# Patient Record
Sex: FEMALE | Race: WHITE | NOT HISPANIC OR LATINO | Employment: PART TIME | URBAN - METROPOLITAN AREA
[De-identification: names, ages, dates, MRNs, and addresses within clinical notes are randomized per-mention and may not be internally consistent; named-entity substitution may affect disease eponyms.]

---

## 2017-04-27 ENCOUNTER — APPOINTMENT (OUTPATIENT)
Dept: URGENT CARE | Age: 67
End: 2017-04-27
Payer: OTHER MISCELLANEOUS

## 2017-04-27 ENCOUNTER — HOSPITAL ENCOUNTER (OUTPATIENT)
Dept: RADIOLOGY | Age: 67
Discharge: HOME | End: 2017-04-27
Attending: PHYSICIAN ASSISTANT
Payer: OTHER MISCELLANEOUS

## 2017-04-27 ENCOUNTER — TRANSCRIBE ORDERS (OUTPATIENT)
Dept: URGENT CARE | Age: 67
End: 2017-04-27

## 2017-04-27 DIAGNOSIS — T14.90XA INJURY: Primary | ICD-10-CM

## 2017-04-27 DIAGNOSIS — T14.90XA INJURY: ICD-10-CM

## 2017-04-27 PROCEDURE — 99283 EMERGENCY DEPT VISIT LOW MDM: CPT | Performed by: PREVENTIVE MEDICINE

## 2017-04-27 PROCEDURE — 73110 X-RAY EXAM OF WRIST: CPT

## 2017-04-27 PROCEDURE — G0382 LEV 3 HOSP TYPE B ED VISIT: HCPCS | Performed by: PREVENTIVE MEDICINE

## 2018-01-27 ENCOUNTER — OFFICE VISIT (OUTPATIENT)
Dept: URGENT CARE | Age: 68
End: 2018-01-27

## 2018-01-27 VITALS
SYSTOLIC BLOOD PRESSURE: 100 MMHG | WEIGHT: 92 LBS | HEART RATE: 70 BPM | BODY MASS INDEX: 15.71 KG/M2 | RESPIRATION RATE: 20 BRPM | OXYGEN SATURATION: 98 % | DIASTOLIC BLOOD PRESSURE: 64 MMHG | HEIGHT: 64 IN | TEMPERATURE: 98.2 F

## 2018-01-27 DIAGNOSIS — L03.116 CELLULITIS OF LEFT FOOT: Primary | ICD-10-CM

## 2018-01-27 PROCEDURE — G0383 LEV 4 HOSP TYPE B ED VISIT: HCPCS | Performed by: FAMILY MEDICINE

## 2018-01-27 RX ORDER — CEFADROXIL 500 MG/1
500 CAPSULE ORAL EVERY 12 HOURS SCHEDULED
Qty: 20 CAPSULE | Refills: 0 | Status: SHIPPED | OUTPATIENT
Start: 2018-01-27 | End: 2018-02-06

## 2018-01-27 NOTE — PROGRESS NOTES
Assessment/Plan:    No problem-specific Assessment & Plan notes found for this encounter  Diagnoses and all orders for this visit:    Cellulitis of left foot  -     cefadroxil (DURICEF) 500 mg capsule; Take 1 capsule (500 mg total) by mouth every 12 (twelve) hours for 10 days  - Take Duricef twice daily for 10 days  - Probiotics daily  - Go to the ER if the swelling and redness does not improve in 3 days, or if it worsens at any time  - Follow up with a dermatologist to treat your psoriasis  Other orders  -     ibuprofen (ADVIL,MOTRIN) 100 MG tablet; Take 100 mg by mouth every 6 (six) hours as needed for mild pain          Subjective:      Patient ID: Ashwin Garcia is a 69 y o  female  27-year-old female smoker presents with erythema and warmth of her left foot  She states that she used a pumice stone to remove psoriasis plaques on the dorsal aspect her left foot approximately 6 days ago  Approximately 3-4 days ago, the area became erythematous and swollen  She also noticed pain in the area  She was never officially diagnosed with psoriasis by dermatologists, however 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  The following portions of the patient's history were reviewed and updated as appropriate: allergies, current medications, past family history, past medical history, past social history, past surgical history and problem list     Review of Systems   Constitutional: Negative for activity change, chills and fever  HENT: Negative  Skin:        Redness, warmth   All other systems reviewed and are negative  Objective:  Vitals:    01/27/18 1643   BP: 100/64   Pulse: 70   Resp: 20   Temp: 98 2 °F (36 8 °C)   SpO2: 98%          Physical Exam   Constitutional: She is oriented to person, place, and time  She appears well-developed and well-nourished  Neurological: She is alert and oriented to person, place, and time     Skin:        Psychiatric: She has a normal mood and affect  Her behavior is normal    Nursing note and vitals reviewed

## 2018-01-27 NOTE — PATIENT INSTRUCTIONS
Take Duricef twice daily for 10 days  Probiotics daily  Go to the ER if the swelling and redness does not improve in 3 days, or if it worsens at any time  Follow up with a dermatologist to treat your psoriasis  Cellulitis   WHAT YOU NEED TO KNOW:   Cellulitis is a skin infection caused by bacteria  Cellulitis may go away on its own or you may need treatment  Your healthcare provider may draw a [place] around the outside edges of your cellulitis  If your cellulitis spreads, your healthcare provider will see it outside of the [place]  DISCHARGE INSTRUCTIONS:   Call 911 if:   · You have sudden trouble breathing or chest pain  Return to the emergency department if:   · Your wound gets larger and more painful  · You feel a crackling under your skin when you touch it  · You have purple dots or bumps on your skin, or you see bleeding under your skin  · You have new swelling and pain in your legs  · The red, warm, swollen area gets larger  · You see red streaks coming from the infected area  Contact your healthcare provider if:   · You have a fever  · Your fever or pain does not go away or gets worse  · The area does not get smaller after 2 days of antibiotics  · Your skin is flaking or peeling off  · You have questions or concerns about your condition or care  Medicines:   · Antibiotics  help treat the bacterial infection  · NSAIDs , such as ibuprofen, help decrease swelling, pain, and fever  NSAIDs can cause stomach bleeding or kidney problems in certain people  If you take blood thinner medicine, always ask if NSAIDs are safe for you  Always read the medicine label and follow directions  Do not give these medicines to children under 10months of age without direction from your child's healthcare provider  · Acetaminophen  decreases pain and fever  It is available without a doctor's order  Ask how much to take and how often to take it  Follow directions   Read the labels of all other medicines you are using to see if they also contain acetaminophen, or ask your doctor or pharmacist  Acetaminophen can cause liver damage if not taken correctly  Do not use more than 4 grams (4,000 milligrams) total of acetaminophen in one day  · Take your medicine as directed  Contact your healthcare provider if you think your medicine is not helping or if you have side effects  Tell him or her if you are allergic to any medicine  Keep a list of the medicines, vitamins, and herbs you take  Include the amounts, and when and why you take them  Bring the list or the pill bottles to follow-up visits  Carry your medicine list with you in case of an emergency  Self-care:   · Elevate the area above the level of your heart  as often as you can  This will help decrease swelling and pain  Prop the area on pillows or blankets to keep it elevated comfortably  · Clean the area daily until the wound scabs over  Gently wash the area with soap and water  Pat dry  Use dressings as directed  · Place cool or warm, wet cloths on the area as directed  Use clean cloths and clean water  Leave it on the area until the cloth is room temperature  Pat the area dry with a clean, dry cloth  The cloths may help decrease pain  Prevent cellulitis:   · Do not scratch bug bites or areas of injury  You increase your risk for cellulitis by scratching these areas  · Do not share personal items, such as towels, clothing, and razors  · Clean exercise equipment  with germ-killing  before and after you use it  · Wash your hands often  Use soap and water  Wash your hands after you use the bathroom, change a child's diapers, or sneeze  Wash your hands before you prepare or eat food  Use lotion to prevent dry, cracked skin  · Wear pressure stockings as directed  You may be told to wear the stockings if you have peripheral edema  The stockings improve blood flow and decrease swelling      · Treat athlete's foot  This can help prevent the spread of a bacterial skin infection  Follow up with your healthcare provider within 3 days, or as directed: Your healthcare provider will check if your cellulitis is getting better  You may need different medicine  Write down your questions so you remember to ask them during your visits  © 2017 [employer] Information is for End User's use only and may not be sold, redistributed or otherwise used for commercial purposes  All illustrations and images included in CareNotes® are the copyrighted property of [employer]  or [employer]  The above information is an  only  It is not intended as medical advice for individual conditions or treatments  Talk to your doctor, nurse or pharmacist before following any medical regimen to see if it is safe and effective for you

## 2018-03-29 ENCOUNTER — OFFICE VISIT (OUTPATIENT)
Dept: URGENT CARE | Age: 68
End: 2018-03-29

## 2018-03-29 VITALS
TEMPERATURE: 97.8 F | HEART RATE: 97 BPM | BODY MASS INDEX: 15.36 KG/M2 | HEIGHT: 64 IN | WEIGHT: 90 LBS | RESPIRATION RATE: 18 BRPM | OXYGEN SATURATION: 97 % | SYSTOLIC BLOOD PRESSURE: 111 MMHG | DIASTOLIC BLOOD PRESSURE: 54 MMHG

## 2018-03-29 DIAGNOSIS — L03.116 CELLULITIS OF LEFT FOOT: Primary | ICD-10-CM

## 2018-03-29 PROCEDURE — G0381 LEV 2 HOSP TYPE B ED VISIT: HCPCS | Performed by: FAMILY MEDICINE

## 2018-03-29 NOTE — PROGRESS NOTES
[facility]        NAME: Luis Perez is a 69 y o  female  : 1950    MRN: [id number]  DATE: 2018  TIME: 2:49 PM    Assessment and Plan   Cellulitis of left foot [L03 116]  1  Cellulitis of left foot  mupirocin (BACTROBAN) 2 % ointment         Patient Instructions     Patient Instructions   Prescription antibiotic ointment to area twice a day  Please try to avoid pressure on area  Comfortable footwear  Recheck/follow-up with Podiatry or dermatologist as discussed  [facility]  [phone]        Proceed to  ER if symptoms worsen  Chief Complaint     Chief Complaint   Patient presents with    Foot Pain     LEFT FOOT , DENIES INJURY, SEEN HERE 2018          History of Present Illness       Erythematous dry area present dorsal aspect of left foot; no drainage present; patient was seen here 2018 for same; skin lesion appears to possibly be a pressure sore/irritation as patient has a similar area present on dorsum of right foot though it is not reddened (patient states she sits cross-legged on the floor)        Review of Systems   Review of Systems   Skin:        Erythematous dry lesion dorsum of left foot; no drainage present         Current Medications       Current Outpatient Prescriptions:     ibuprofen (ADVIL,MOTRIN) 100 MG tablet, Take 100 mg by mouth every 6 (six) hours as needed for mild pain, Disp: , Rfl:     mupirocin (BACTROBAN) 2 % ointment, Apply topically 2 (two) times a day, Disp: 22 g, Rfl: 1    Current Allergies     Allergies as of 2018 - Reviewed 2018   Allergen Reaction Noted    Tetracyclines & related Hives 2018            The following portions of the patient's history were reviewed and updated as appropriate: allergies, current medications, past family history, past medical history, past social history, past surgical history and problem list      History reviewed  No pertinent past medical history  History reviewed   No pertinent surgical history  No family history on file  Medications have been verified  Objective   /54   Pulse 97   Temp 97 8 °F (36 6 °C) (Temporal)   Resp 18   Ht 5' 4" (1 626 m)   Wt 40 8 kg (90 lb)   SpO2 97%   BMI 15 45 kg/m²        Physical Exam     Physical Exam   Skin: Skin is warm and dry     Erythematous lesion present dorsum of left foot; no drainage noted

## 2018-03-29 NOTE — PATIENT INSTRUCTIONS
Prescription antibiotic ointment to area twice a day  Please try to avoid pressure on area  Comfortable footwear  Recheck/follow-up with Podiatry or dermatologist as discussed    Cortez Kumar  [phone]

## 2018-04-02 ENCOUNTER — OFFICE VISIT (OUTPATIENT)
Dept: URGENT CARE | Age: 68
End: 2018-04-02

## 2018-04-02 VITALS
WEIGHT: 90 LBS | HEART RATE: 68 BPM | TEMPERATURE: 98.1 F | RESPIRATION RATE: 18 BRPM | HEIGHT: 64 IN | DIASTOLIC BLOOD PRESSURE: 60 MMHG | BODY MASS INDEX: 15.36 KG/M2 | SYSTOLIC BLOOD PRESSURE: 98 MMHG

## 2018-04-02 DIAGNOSIS — L03.116 CELLULITIS OF LEFT FOOT: Primary | ICD-10-CM

## 2018-04-02 PROCEDURE — G0382 LEV 3 HOSP TYPE B ED VISIT: HCPCS | Performed by: FAMILY MEDICINE

## 2018-04-02 NOTE — PROGRESS NOTES
[facility]        NAME: Julianen Diana is a 69 y o  female  : 1950    MRN: [id number]  DATE: 2018  TIME: 6:53 PM    Assessment and Plan   Cellulitis of left foot [L03 116]  1  Cellulitis of left foot           Patient Instructions   Continue using bactroban ointment as directed for 10 days  Follow up with podiatry  Reassurance given- discussed at length with patient  Possible leg pain due to gait compensation from foot wound  Chief Complaint     Chief Complaint   Patient presents with    Leg Swelling     c/o left upper thigh swelling  Was seen here on 2018         History of Present Illness       68 y/o female presents with concern for left leg swelling since yesterday  She states "I could be imagining this, but I want you to look"  She also has slight discomfort of her left leg  She is on her feet at work  She is being treated for cellulitis of her left foot with bactroban  She has attempted to make an appt with podiatry, but they have been closed the past two business days due to the Easter holiday  She denies fevers/chills/pus drainage  Review of Systems   Review of Systems   Constitutional: Negative for chills and fever  Skin:         Cellulitis of left foot   All other systems reviewed and are negative          Current Medications       Current Outpatient Prescriptions:     ibuprofen (ADVIL,MOTRIN) 100 MG tablet, Take 100 mg by mouth every 6 (six) hours as needed for mild pain, Disp: , Rfl:     mupirocin (BACTROBAN) 2 % ointment, Apply topically 2 (two) times a day, Disp: 22 g, Rfl: 1    Current Allergies     Allergies as of 2018 - Reviewed 2018   Allergen Reaction Noted    Tetracyclines & related Hives 2018            The following portions of the patient's history were reviewed and updated as appropriate: allergies, current medications, past family history, past medical history, past social history, past surgical history and problem list    History reviewed  No pertinent past medical history  History reviewed  No pertinent surgical history  Objective   BP 98/60   Pulse 68   Temp 98 1 °F (36 7 °C) (Temporal)   Resp 18   Ht 5' 4" (1 626 m)   Wt 40 8 kg (90 lb)   BMI 15 45 kg/m²        Physical Exam     Physical Exam   Constitutional: She is oriented to person, place, and time  She appears well-developed and well-nourished  Neurological: She is alert and oriented to person, place, and time  Skin:        No swelling of left leg  No lymphangitis  nontender left leg  Psychiatric: She has a normal mood and affect  Her behavior is normal    Nursing note and vitals reviewed

## 2018-04-02 NOTE — PATIENT INSTRUCTIONS
Continue using bactroban ointment as directed for 10 days  Follow up with podiatry  ER if worsening s/s

## 2019-01-22 ENCOUNTER — OFFICE VISIT (OUTPATIENT)
Dept: URGENT CARE | Age: 69
End: 2019-01-22

## 2019-01-22 ENCOUNTER — APPOINTMENT (OUTPATIENT)
Dept: RADIOLOGY | Age: 69
End: 2019-01-22
Attending: FAMILY MEDICINE

## 2019-01-22 VITALS
RESPIRATION RATE: 20 BRPM | WEIGHT: 92 LBS | OXYGEN SATURATION: 98 % | HEART RATE: 69 BPM | HEIGHT: 64 IN | BODY MASS INDEX: 15.71 KG/M2 | SYSTOLIC BLOOD PRESSURE: 122 MMHG | TEMPERATURE: 98.2 F | DIASTOLIC BLOOD PRESSURE: 76 MMHG

## 2019-01-22 DIAGNOSIS — M79.672 LEFT FOOT PAIN: Primary | ICD-10-CM

## 2019-01-22 DIAGNOSIS — R52 PAIN: ICD-10-CM

## 2019-01-22 PROCEDURE — 73630 X-RAY EXAM OF FOOT: CPT

## 2019-01-22 PROCEDURE — G0382 LEV 3 HOSP TYPE B ED VISIT: HCPCS | Performed by: FAMILY MEDICINE

## 2019-01-22 NOTE — LETTER
January 22, 2019     Patient: Michelle Parikh   YOB: 1950   Date of Visit: 1/22/2019       To Whom It May Concern: It is my medical opinion that Erich Loza may return to work on 01/25/2019  If you have any questions or concerns, please don't hesitate to call           Sincerely,        Ashutosh Del Real DO    CC: No Recipients

## 2019-01-22 NOTE — PATIENT INSTRUCTIONS
Rest, elevate left foot, limit activity  Comfortable footwear  Continue to use cane to assist ambulation  Pain medication as needed  Recheck/follow-up with family physician/podiatry as discussed    Griselda Lincoln   [phone]

## 2019-01-22 NOTE — PROGRESS NOTES
[facility]        NAME: Vivien Older is a 77 y o  female  : 1950    MRN: [id number]  DATE: 2019  TIME: 6:32 PM    Assessment and Plan   Left foot pain [M79 672]  1  Left foot pain     2  Pain  XR foot 3+ vw left         Patient Instructions     Patient Instructions   Rest, elevate left foot, limit activity  Comfortable footwear  Continue to use cane to assist ambulation  Pain medication as needed  Recheck/follow-up with family physician/podiatry as discussed  [facility]   [phone]          Chief Complaint     Chief Complaint   Patient presents with    Foot Pain     left foot pain radiating up to leg it's been for 2 weeks  History of Present Illness       Patient with generalized pain in her left foot (mainly dorsal aspect) for the past 2 weeks; no known injury; patient states she is using a cane to assist ambulation        Review of Systems   Review of Systems   Musculoskeletal:        Generalized pain in the left foot with slight swelling         Current Medications       Current Outpatient Prescriptions:     ibuprofen (ADVIL,MOTRIN) 100 MG tablet, Take 100 mg by mouth every 6 (six) hours as needed for mild pain, Disp: , Rfl:     mupirocin (BACTROBAN) 2 % ointment, Apply topically 2 (two) times a day (Patient not taking: Reported on 2019 ), Disp: 22 g, Rfl: 1    Current Allergies     Allergies as of 2019 - Reviewed 2019   Allergen Reaction Noted    Tetracyclines & related Hives 2018            The following portions of the patient's history were reviewed and updated as appropriate: allergies, current medications, past family history, past medical history, past social history, past surgical history and problem list      History reviewed  No pertinent past medical history  History reviewed  No pertinent surgical history  History reviewed  No pertinent family history  Medications have been verified          Objective   BP 122/76   Pulse 69   Temp 98 2 °F (36 8 °C) (Temporal)   Resp 20   Ht 5' 4" (1 626 m)   Wt 41 7 kg (92 lb)   SpO2 98%   BMI 15 79 kg/m²        Physical Exam     Physical Exam   Musculoskeletal:   Generalized discomfort of the dorsal aspect of the left foot with slight swelling   Nursing note and vitals reviewed          Left foot x-rays - no fracture noted

## 2020-06-16 ENCOUNTER — OFFICE VISIT (OUTPATIENT)
Dept: FAMILY MEDICINE CLINIC | Facility: CLINIC | Age: 70
End: 2020-06-16

## 2020-06-16 VITALS
HEIGHT: 65 IN | BODY MASS INDEX: 15.73 KG/M2 | WEIGHT: 94.4 LBS | SYSTOLIC BLOOD PRESSURE: 98 MMHG | RESPIRATION RATE: 16 BRPM | TEMPERATURE: 99.2 F | OXYGEN SATURATION: 97 % | HEART RATE: 82 BPM | DIASTOLIC BLOOD PRESSURE: 60 MMHG

## 2020-06-16 DIAGNOSIS — R19.00 ABDOMINAL WALL BULGE: ICD-10-CM

## 2020-06-16 DIAGNOSIS — J30.2 SEASONAL ALLERGIES: ICD-10-CM

## 2020-06-16 DIAGNOSIS — Z12.39 SCREENING FOR BREAST CANCER: ICD-10-CM

## 2020-06-16 DIAGNOSIS — F17.219 CIGARETTE NICOTINE DEPENDENCE WITH NICOTINE-INDUCED DISORDER: ICD-10-CM

## 2020-06-16 DIAGNOSIS — H53.9 VISUAL DISTURBANCES: Primary | ICD-10-CM

## 2020-06-16 PROBLEM — L03.116 CELLULITIS OF LEFT FOOT: Status: RESOLVED | Noted: 2018-01-27 | Resolved: 2020-06-16

## 2020-06-16 PROCEDURE — 3008F BODY MASS INDEX DOCD: CPT | Performed by: FAMILY MEDICINE

## 2020-06-16 PROCEDURE — 1160F RVW MEDS BY RX/DR IN RCRD: CPT | Performed by: FAMILY MEDICINE

## 2020-06-16 PROCEDURE — 99203 OFFICE O/P NEW LOW 30 MIN: CPT | Performed by: FAMILY MEDICINE

## 2020-06-17 DIAGNOSIS — H53.9 VISUAL DISTURBANCES: Primary | ICD-10-CM

## 2020-06-22 ENCOUNTER — TELEPHONE (OUTPATIENT)
Dept: FAMILY MEDICINE CLINIC | Facility: CLINIC | Age: 70
End: 2020-06-22

## 2020-06-22 PROBLEM — F17.219 CIGARETTE NICOTINE DEPENDENCE WITH NICOTINE-INDUCED DISORDER: Status: RESOLVED | Noted: 2020-06-16 | Resolved: 2020-06-22

## 2020-06-22 PROBLEM — Z72.0 TOBACCO USE: Status: ACTIVE | Noted: 2020-06-22

## 2020-06-23 DIAGNOSIS — H53.9 VISUAL DISTURBANCES: ICD-10-CM

## 2020-06-23 DIAGNOSIS — Z72.0 TOBACCO USE: Primary | ICD-10-CM

## 2020-06-29 ENCOUNTER — TELEPHONE (OUTPATIENT)
Dept: FAMILY MEDICINE CLINIC | Facility: CLINIC | Age: 70
End: 2020-06-29

## 2020-07-14 ENCOUNTER — CONSULT (OUTPATIENT)
Dept: SURGERY | Facility: CLINIC | Age: 70
End: 2020-07-14

## 2020-07-14 VITALS
WEIGHT: 94.2 LBS | DIASTOLIC BLOOD PRESSURE: 68 MMHG | HEART RATE: 76 BPM | TEMPERATURE: 99 F | SYSTOLIC BLOOD PRESSURE: 104 MMHG | HEIGHT: 65 IN | BODY MASS INDEX: 15.7 KG/M2

## 2020-07-14 DIAGNOSIS — R19.00 ABDOMINAL WALL BULGE: ICD-10-CM

## 2020-07-14 PROCEDURE — 99201 PR OFFICE OUTPATIENT NEW 10 MINUTES: CPT | Performed by: SURGERY

## 2020-07-14 NOTE — ASSESSMENT & PLAN NOTE
I told her I do not believe there is a hernia present  I suggested some sort of support if that makes her feel better otherwise nothing to do surgically

## 2020-07-14 NOTE — PROGRESS NOTES
Office Visit - General Surgery  Kallie Kaminski MRN: [id number]  Encounter: [id number]    Assessment and Plan    Problem List Items Addressed This Visit        Other    Abdominal wall bulge     I told her I do not believe there is a hernia present  I suggested some sort of support if that makes her feel better otherwise nothing to do surgically  Chief Complaint:  Kallie Kaminski is a 65 y o  female who presents for Mass (Consult abdominal buldge)    Subjective  A 68-year-old woman who was noted a bulge in her lower abdomen  Has been there for some time it is basically annoying because she notices it  Causes no pain change in bowel or bladder habits    Past Medical History  Past Medical History:   Diagnosis Date    Seasonal allergies        Past Surgical History  History reviewed  No pertinent surgical history      Family History  Family History   Problem Relation Age of Onset    No Known Problems Mother        Social History  Social History     Socioeconomic History    Marital status: Single     Spouse name: None    Number of children: None    Years of education: None    Highest education level: None   Occupational History    None   Social Needs    Financial resource strain: None    Food insecurity:     Worry: None     Inability: None    Transportation needs:     Medical: None     Non-medical: None   Tobacco Use    Smoking status: Light Tobacco Smoker    Smokeless tobacco: Never Used   Substance and Sexual Activity    Alcohol use: No    Drug use: No    Sexual activity: Never   Lifestyle    Physical activity:     Days per week: None     Minutes per session: None    Stress: None   Relationships    Social connections:     Talks on phone: None     Gets together: None     Attends [employer] service: None     Active member of club or organization: None     Attends meetings of clubs or organizations: None     Relationship status: None    Intimate partner violence:     Fear of current or ex partner: None     Emotionally abused: None     Physically abused: None     Forced sexual activity: None   Other Topics Concern    None   Social History Narrative    None        Medications  Current Outpatient Medications on File Prior to Visit   Medication Sig Dispense Refill    ibuprofen (ADVIL,MOTRIN) 100 MG tablet Take 100 mg by mouth every 6 (six) hours as needed for mild pain      mupirocin (BACTROBAN) 2 % ointment Apply topically 2 (two) times a day (Patient not taking: Reported on 7/14/2020) 22 g 1     No current facility-administered medications on file prior to visit  Allergies  Allergies   Allergen Reactions    Tetracyclines & Related Hives       Review of Systems    Objective  Vitals:    07/14/20 1112   BP: 104/68   Pulse: 76   Temp: 99 °F (37 2 °C)       Physical Exam   Abdomen:  Flat soft nontender no hernias appreciated    Standing up little bit of bulge below umbilicus but no definite hernias appreciated

## 2020-08-21 ENCOUNTER — APPOINTMENT (OUTPATIENT)
Dept: RADIOLOGY | Age: 70
DRG: 330 | End: 2020-08-21
Payer: MEDICARE

## 2020-08-21 ENCOUNTER — OFFICE VISIT (OUTPATIENT)
Dept: URGENT CARE | Age: 70
End: 2020-08-21

## 2020-08-21 VITALS
RESPIRATION RATE: 18 BRPM | SYSTOLIC BLOOD PRESSURE: 127 MMHG | OXYGEN SATURATION: 98 % | DIASTOLIC BLOOD PRESSURE: 62 MMHG | TEMPERATURE: 98.6 F | HEART RATE: 86 BPM

## 2020-08-21 DIAGNOSIS — R10.9 ABDOMINAL PAIN: ICD-10-CM

## 2020-08-21 DIAGNOSIS — R10.30 LOWER ABDOMINAL PAIN: Primary | ICD-10-CM

## 2020-08-21 LAB
SL AMB  POCT GLUCOSE, UA: ABNORMAL
SL AMB LEUKOCYTE ESTERASE,UA: ABNORMAL
SL AMB POCT BILIRUBIN,UA: ABNORMAL
SL AMB POCT BLOOD,UA: ABNORMAL
SL AMB POCT CLARITY,UA: CLEAR
SL AMB POCT COLOR,UA: YELLOW
SL AMB POCT KETONES,UA: ABNORMAL
SL AMB POCT NITRITE,UA: ABNORMAL
SL AMB POCT PH,UA: 6
SL AMB POCT SPECIFIC GRAVITY,UA: 1.01
SL AMB POCT URINE PROTEIN: ABNORMAL
SL AMB POCT UROBILINOGEN: 0.2

## 2020-08-21 PROCEDURE — 81002 URINALYSIS NONAUTO W/O SCOPE: CPT | Performed by: PHYSICIAN ASSISTANT

## 2020-08-21 PROCEDURE — 99213 OFFICE O/P EST LOW 20 MIN: CPT | Performed by: PHYSICIAN ASSISTANT

## 2020-08-21 PROCEDURE — 74022 RADEX COMPL AQT ABD SERIES: CPT

## 2020-08-21 NOTE — PATIENT INSTRUCTIONS
-clear liquid diet,  -drink plenty of fluids  -go to ER if anything worsens or no bowel movement  -follow up with PCP 3-5 days

## 2020-08-21 NOTE — PROGRESS NOTES
[facility]        NAME: Patricia Gray is a 65 y o  female  : 1950    MRN: [id number]  DATE: 2020  TIME: 7:52 PM    Assessment and Plan   Lower abdominal pain [R10 30]  1  Lower abdominal pain  POCT urine dip         Patient Instructions   -clear liquid diet,  -drink plenty of fluids  -go to ER if anything worsens or no bowel movement  Patient declined going to the emergency room tonight  Follow up with PCP in 3-5 days  Proceed to  ER if symptoms worsen  Chief Complaint     Chief Complaint   Patient presents with    Abdominal Pain     Patient reports lower abdominal cramping since Wednesday  Denies nausea, vomiting, and diarrhea  She reports she took immodium on Monday or Tuesday and then didn't have a BM (small) until today- she reports taking "ClearLax"  She reports the pain has subsided slightly today  History of Present Illness       Pt started with lower abdominal pain on Wednesday with achy cramping  She did take an immodium on Monday  She had a small bowel movement today  She denies any blood  She denies any nausea, vomiting, diarrhea  She did take a laxative today  She was seen for a possible hernia but was told she did not have one  She notes that she has bruising on her right hand that started while she was in the bathroom here in the clinic  She had no injury or pain  Review of Systems   Review of Systems   Constitutional: Negative  HENT: Negative  Respiratory: Negative  Cardiovascular: Negative  Gastrointestinal: Positive for abdominal pain and constipation  Negative for anal bleeding, blood in stool, diarrhea, nausea, rectal pain and vomiting  Musculoskeletal: Negative  Skin: Positive for color change  Neurological: Negative  Psychiatric/Behavioral: Negative            Current Medications       Current Outpatient Medications:     ibuprofen (ADVIL,MOTRIN) 100 MG tablet, Take 100 mg by mouth every 6 (six) hours as needed for mild pain, Disp: , Rfl:     mupirocin (BACTROBAN) 2 % ointment, Apply topically 2 (two) times a day (Patient not taking: Reported on 7/14/2020), Disp: 22 g, Rfl: 1    Current Allergies     Allergies as of 08/21/2020 - Reviewed 08/21/2020   Allergen Reaction Noted    Tetracyclines & related Hives 01/27/2018            The following portions of the patient's history were reviewed and updated as appropriate: allergies, current medications, past family history, past medical history, past social history, past surgical history and problem list      Past Medical History:   Diagnosis Date    Seasonal allergies        History reviewed  No pertinent surgical history  Family History   Problem Relation Age of Onset    No Known Problems Mother          Medications have been verified  Objective   /62   Pulse 86   Temp 98 6 °F (37 °C) (Temporal)   Resp 18   SpO2 98%        Physical Exam     Physical Exam  Vitals signs and nursing note reviewed  Constitutional:       Appearance: She is well-developed  Cardiovascular:      Rate and Rhythm: Normal rate  Pulmonary:      Effort: Pulmonary effort is normal    Abdominal:      General: Abdomen is flat  Bowel sounds are normal  There is no distension  Palpations: Abdomen is soft  There is no shifting dullness, fluid wave, hepatomegaly, mass or pulsatile mass  Tenderness: There is generalized abdominal tenderness  There is no right CVA tenderness, left CVA tenderness, guarding or rebound  Negative signs include Rovsing's sign and McBurney's sign  Hernia: No hernia is present  Comments: Mild tenderness to palpation over lower abdomen   Skin:     General: Skin is warm and dry  Neurological:      General: No focal deficit present  Mental Status: She is alert

## 2020-08-22 ENCOUNTER — APPOINTMENT (EMERGENCY)
Dept: RADIOLOGY | Facility: HOSPITAL | Age: 70
DRG: 330 | End: 2020-08-22
Payer: MEDICARE

## 2020-08-22 ENCOUNTER — TELEPHONE (OUTPATIENT)
Dept: URGENT CARE | Age: 70
End: 2020-08-22

## 2020-08-22 ENCOUNTER — ANESTHESIA (INPATIENT)
Dept: PERIOP | Facility: HOSPITAL | Age: 70
DRG: 330 | End: 2020-08-22
Payer: MEDICARE

## 2020-08-22 ENCOUNTER — HOSPITAL ENCOUNTER (INPATIENT)
Facility: HOSPITAL | Age: 70
LOS: 3 days | Discharge: LEFT BEFORE DISCHARGE | DRG: 330 | End: 2020-08-25
Attending: EMERGENCY MEDICINE | Admitting: SURGERY
Payer: MEDICARE

## 2020-08-22 ENCOUNTER — ANESTHESIA EVENT (INPATIENT)
Dept: PERIOP | Facility: HOSPITAL | Age: 70
DRG: 330 | End: 2020-08-22
Payer: MEDICARE

## 2020-08-22 DIAGNOSIS — K56.609 BOWEL OBSTRUCTION (HCC): Primary | ICD-10-CM

## 2020-08-22 LAB
ABO GROUP BLD: NORMAL
ABO GROUP BLD: NORMAL
ALBUMIN SERPL BCP-MCNC: 3.2 G/DL (ref 3.5–5)
ALP SERPL-CCNC: 87 U/L (ref 46–116)
ALT SERPL W P-5'-P-CCNC: 11 U/L (ref 12–78)
ANION GAP SERPL CALCULATED.3IONS-SCNC: 8 MMOL/L (ref 4–13)
AST SERPL W P-5'-P-CCNC: 16 U/L (ref 5–45)
BASOPHILS # BLD AUTO: 0.02 THOUSANDS/ΜL (ref 0–0.1)
BASOPHILS NFR BLD AUTO: 0 % (ref 0–1)
BILIRUB SERPL-MCNC: 0.77 MG/DL (ref 0.2–1)
BLD GP AB SCN SERPL QL: NEGATIVE
BUN SERPL-MCNC: 14 MG/DL (ref 5–25)
CALCIUM SERPL-MCNC: 8.8 MG/DL (ref 8.3–10.1)
CHLORIDE SERPL-SCNC: 108 MMOL/L (ref 100–108)
CO2 SERPL-SCNC: 22 MMOL/L (ref 21–32)
CREAT SERPL-MCNC: 0.84 MG/DL (ref 0.6–1.3)
EOSINOPHIL # BLD AUTO: 0.19 THOUSAND/ΜL (ref 0–0.61)
EOSINOPHIL NFR BLD AUTO: 2 % (ref 0–6)
ERYTHROCYTE [DISTWIDTH] IN BLOOD BY AUTOMATED COUNT: 13.3 % (ref 11.6–15.1)
GFR SERPL CREATININE-BSD FRML MDRD: 70 ML/MIN/1.73SQ M
GLUCOSE SERPL-MCNC: 102 MG/DL (ref 65–140)
HCT VFR BLD AUTO: 42.2 % (ref 34.8–46.1)
HGB BLD-MCNC: 14 G/DL (ref 11.5–15.4)
IMM GRANULOCYTES # BLD AUTO: 0.03 THOUSAND/UL (ref 0–0.2)
IMM GRANULOCYTES NFR BLD AUTO: 0 % (ref 0–2)
LACTATE SERPL-SCNC: 0.9 MMOL/L (ref 0.5–2)
LIPASE SERPL-CCNC: 45 U/L (ref 73–393)
LYMPHOCYTES # BLD AUTO: 0.88 THOUSANDS/ΜL (ref 0.6–4.47)
LYMPHOCYTES NFR BLD AUTO: 9 % (ref 14–44)
MCH RBC QN AUTO: 33.9 PG (ref 26.8–34.3)
MCHC RBC AUTO-ENTMCNC: 33.2 G/DL (ref 31.4–37.4)
MCV RBC AUTO: 102 FL (ref 82–98)
MONOCYTES # BLD AUTO: 0.7 THOUSAND/ΜL (ref 0.17–1.22)
MONOCYTES NFR BLD AUTO: 7 % (ref 4–12)
NEUTROPHILS # BLD AUTO: 7.77 THOUSANDS/ΜL (ref 1.85–7.62)
NEUTS SEG NFR BLD AUTO: 82 % (ref 43–75)
NRBC BLD AUTO-RTO: 0 /100 WBCS
PLATELET # BLD AUTO: 300 THOUSANDS/UL (ref 149–390)
PLATELET # BLD AUTO: 308 THOUSANDS/UL (ref 149–390)
PMV BLD AUTO: 9.7 FL (ref 8.9–12.7)
PMV BLD AUTO: 9.8 FL (ref 8.9–12.7)
POTASSIUM SERPL-SCNC: 3.8 MMOL/L (ref 3.5–5.3)
PROT SERPL-MCNC: 6.8 G/DL (ref 6.4–8.2)
RBC # BLD AUTO: 4.13 MILLION/UL (ref 3.81–5.12)
RH BLD: POSITIVE
RH BLD: POSITIVE
SODIUM SERPL-SCNC: 138 MMOL/L (ref 136–145)
SPECIMEN EXPIRATION DATE: NORMAL
WBC # BLD AUTO: 9.59 THOUSAND/UL (ref 4.31–10.16)

## 2020-08-22 PROCEDURE — 99285 EMERGENCY DEPT VISIT HI MDM: CPT

## 2020-08-22 PROCEDURE — 99222 1ST HOSP IP/OBS MODERATE 55: CPT | Performed by: SURGERY

## 2020-08-22 PROCEDURE — 80053 COMPREHEN METABOLIC PANEL: CPT | Performed by: EMERGENCY MEDICINE

## 2020-08-22 PROCEDURE — 74177 CT ABD & PELVIS W/CONTRAST: CPT

## 2020-08-22 PROCEDURE — 86900 BLOOD TYPING SEROLOGIC ABO: CPT | Performed by: SURGERY

## 2020-08-22 PROCEDURE — 96360 HYDRATION IV INFUSION INIT: CPT

## 2020-08-22 PROCEDURE — 85049 AUTOMATED PLATELET COUNT: CPT | Performed by: SURGERY

## 2020-08-22 PROCEDURE — 88309 TISSUE EXAM BY PATHOLOGIST: CPT | Performed by: PATHOLOGY

## 2020-08-22 PROCEDURE — G1004 CDSM NDSC: HCPCS

## 2020-08-22 PROCEDURE — 0DBA0ZZ EXCISION OF JEJUNUM, OPEN APPROACH: ICD-10-PCS | Performed by: SURGERY

## 2020-08-22 PROCEDURE — 86901 BLOOD TYPING SEROLOGIC RH(D): CPT | Performed by: SURGERY

## 2020-08-22 PROCEDURE — 36415 COLL VENOUS BLD VENIPUNCTURE: CPT | Performed by: EMERGENCY MEDICINE

## 2020-08-22 PROCEDURE — 96361 HYDRATE IV INFUSION ADD-ON: CPT

## 2020-08-22 PROCEDURE — C9290 INJ, BUPIVACAINE LIPOSOME: HCPCS | Performed by: ANESTHESIOLOGY

## 2020-08-22 PROCEDURE — 44121 REMOVAL OF SMALL INTESTINE: CPT | Performed by: SURGERY

## 2020-08-22 PROCEDURE — 85025 COMPLETE CBC W/AUTO DIFF WBC: CPT | Performed by: EMERGENCY MEDICINE

## 2020-08-22 PROCEDURE — 93005 ELECTROCARDIOGRAM TRACING: CPT

## 2020-08-22 PROCEDURE — 99285 EMERGENCY DEPT VISIT HI MDM: CPT | Performed by: EMERGENCY MEDICINE

## 2020-08-22 PROCEDURE — 83605 ASSAY OF LACTIC ACID: CPT | Performed by: EMERGENCY MEDICINE

## 2020-08-22 PROCEDURE — 0DBB0ZZ EXCISION OF ILEUM, OPEN APPROACH: ICD-10-PCS | Performed by: SURGERY

## 2020-08-22 PROCEDURE — 44120 REMOVAL OF SMALL INTESTINE: CPT | Performed by: SURGERY

## 2020-08-22 PROCEDURE — 0DB80ZZ EXCISION OF SMALL INTESTINE, OPEN APPROACH: ICD-10-PCS | Performed by: SURGERY

## 2020-08-22 PROCEDURE — 83690 ASSAY OF LIPASE: CPT | Performed by: EMERGENCY MEDICINE

## 2020-08-22 PROCEDURE — 0DN80ZZ RELEASE SMALL INTESTINE, OPEN APPROACH: ICD-10-PCS | Performed by: SURGERY

## 2020-08-22 PROCEDURE — 86850 RBC ANTIBODY SCREEN: CPT | Performed by: SURGERY

## 2020-08-22 RX ORDER — ONDANSETRON 2 MG/ML
INJECTION INTRAMUSCULAR; INTRAVENOUS AS NEEDED
Status: DISCONTINUED | OUTPATIENT
Start: 2020-08-22 | End: 2020-08-22

## 2020-08-22 RX ORDER — HEPARIN SODIUM 5000 [USP'U]/ML
5000 INJECTION, SOLUTION INTRAVENOUS; SUBCUTANEOUS EVERY 8 HOURS SCHEDULED
Status: DISCONTINUED | OUTPATIENT
Start: 2020-08-22 | End: 2020-08-25 | Stop reason: HOSPADM

## 2020-08-22 RX ORDER — SODIUM CHLORIDE, SODIUM LACTATE, POTASSIUM CHLORIDE, CALCIUM CHLORIDE 600; 310; 30; 20 MG/100ML; MG/100ML; MG/100ML; MG/100ML
75 INJECTION, SOLUTION INTRAVENOUS CONTINUOUS
Status: DISCONTINUED | OUTPATIENT
Start: 2020-08-22 | End: 2020-08-25

## 2020-08-22 RX ORDER — PROPOFOL 10 MG/ML
INJECTION, EMULSION INTRAVENOUS AS NEEDED
Status: DISCONTINUED | OUTPATIENT
Start: 2020-08-22 | End: 2020-08-22

## 2020-08-22 RX ORDER — GLYCOPYRROLATE 0.2 MG/ML
INJECTION INTRAMUSCULAR; INTRAVENOUS AS NEEDED
Status: DISCONTINUED | OUTPATIENT
Start: 2020-08-22 | End: 2020-08-22

## 2020-08-22 RX ORDER — FENTANYL CITRATE 50 UG/ML
INJECTION, SOLUTION INTRAMUSCULAR; INTRAVENOUS AS NEEDED
Status: DISCONTINUED | OUTPATIENT
Start: 2020-08-22 | End: 2020-08-22

## 2020-08-22 RX ORDER — SODIUM CHLORIDE 9 MG/ML
INJECTION, SOLUTION INTRAVENOUS CONTINUOUS PRN
Status: DISCONTINUED | OUTPATIENT
Start: 2020-08-22 | End: 2020-08-22

## 2020-08-22 RX ORDER — MIDAZOLAM HYDROCHLORIDE 2 MG/2ML
INJECTION, SOLUTION INTRAMUSCULAR; INTRAVENOUS AS NEEDED
Status: DISCONTINUED | OUTPATIENT
Start: 2020-08-22 | End: 2020-08-22

## 2020-08-22 RX ORDER — SODIUM CHLORIDE, SODIUM LACTATE, POTASSIUM CHLORIDE, CALCIUM CHLORIDE 600; 310; 30; 20 MG/100ML; MG/100ML; MG/100ML; MG/100ML
100 INJECTION, SOLUTION INTRAVENOUS CONTINUOUS
Status: DISCONTINUED | OUTPATIENT
Start: 2020-08-22 | End: 2020-08-22

## 2020-08-22 RX ORDER — ONDANSETRON 2 MG/ML
4 INJECTION INTRAMUSCULAR; INTRAVENOUS ONCE AS NEEDED
Status: DISCONTINUED | OUTPATIENT
Start: 2020-08-22 | End: 2020-08-22 | Stop reason: HOSPADM

## 2020-08-22 RX ORDER — NEOSTIGMINE METHYLSULFATE 1 MG/ML
INJECTION INTRAVENOUS AS NEEDED
Status: DISCONTINUED | OUTPATIENT
Start: 2020-08-22 | End: 2020-08-22

## 2020-08-22 RX ORDER — ONDANSETRON 2 MG/ML
4 INJECTION INTRAMUSCULAR; INTRAVENOUS EVERY 6 HOURS PRN
Status: DISCONTINUED | OUTPATIENT
Start: 2020-08-22 | End: 2020-08-25 | Stop reason: HOSPADM

## 2020-08-22 RX ORDER — CEFAZOLIN SODIUM 1 G/50ML
1000 SOLUTION INTRAVENOUS
Status: COMPLETED | OUTPATIENT
Start: 2020-08-23 | End: 2020-08-22

## 2020-08-22 RX ORDER — MAGNESIUM HYDROXIDE 1200 MG/15ML
LIQUID ORAL AS NEEDED
Status: DISCONTINUED | OUTPATIENT
Start: 2020-08-22 | End: 2020-08-22 | Stop reason: HOSPADM

## 2020-08-22 RX ORDER — DEXAMETHASONE SODIUM PHOSPHATE 10 MG/ML
INJECTION, SOLUTION INTRAMUSCULAR; INTRAVENOUS AS NEEDED
Status: DISCONTINUED | OUTPATIENT
Start: 2020-08-22 | End: 2020-08-22

## 2020-08-22 RX ORDER — ROCURONIUM BROMIDE 10 MG/ML
INJECTION, SOLUTION INTRAVENOUS AS NEEDED
Status: DISCONTINUED | OUTPATIENT
Start: 2020-08-22 | End: 2020-08-22

## 2020-08-22 RX ORDER — SUCCINYLCHOLINE/SOD CL,ISO/PF 100 MG/5ML
SYRINGE (ML) INTRAVENOUS AS NEEDED
Status: DISCONTINUED | OUTPATIENT
Start: 2020-08-22 | End: 2020-08-22

## 2020-08-22 RX ORDER — HYDROMORPHONE HCL 110MG/55ML
PATIENT CONTROLLED ANALGESIA SYRINGE INTRAVENOUS AS NEEDED
Status: DISCONTINUED | OUTPATIENT
Start: 2020-08-22 | End: 2020-08-22

## 2020-08-22 RX ORDER — HYDROMORPHONE HCL/PF 1 MG/ML
0.5 SYRINGE (ML) INJECTION
Status: DISCONTINUED | OUTPATIENT
Start: 2020-08-22 | End: 2020-08-22 | Stop reason: HOSPADM

## 2020-08-22 RX ADMIN — Medication 40 MG: at 18:32

## 2020-08-22 RX ADMIN — BUPIVACAINE 13 ML: 13.3 INJECTION, SUSPENSION, LIPOSOMAL INFILTRATION at 20:47

## 2020-08-22 RX ADMIN — ROCURONIUM BROMIDE 25 MG: 10 INJECTION, SOLUTION INTRAVENOUS at 18:33

## 2020-08-22 RX ADMIN — HYDROMORPHONE HYDROCHLORIDE 0.5 MG: 2 INJECTION, SOLUTION INTRAMUSCULAR; INTRAVENOUS; SUBCUTANEOUS at 20:56

## 2020-08-22 RX ADMIN — HYDROMORPHONE HYDROCHLORIDE 0.5 MG: 2 INJECTION, SOLUTION INTRAMUSCULAR; INTRAVENOUS; SUBCUTANEOUS at 20:02

## 2020-08-22 RX ADMIN — ROCURONIUM BROMIDE 5 MG: 10 INJECTION, SOLUTION INTRAVENOUS at 20:02

## 2020-08-22 RX ADMIN — Medication: at 21:15

## 2020-08-22 RX ADMIN — SODIUM CHLORIDE, SODIUM LACTATE, POTASSIUM CHLORIDE, AND CALCIUM CHLORIDE: .6; .31; .03; .02 INJECTION, SOLUTION INTRAVENOUS at 18:12

## 2020-08-22 RX ADMIN — METRONIDAZOLE 500 MG: 500 INJECTION, SOLUTION INTRAVENOUS at 18:30

## 2020-08-22 RX ADMIN — MIDAZOLAM 2 MG: 1 INJECTION INTRAMUSCULAR; INTRAVENOUS at 18:22

## 2020-08-22 RX ADMIN — PROPOFOL 100 MG: 10 INJECTION, EMULSION INTRAVENOUS at 18:32

## 2020-08-22 RX ADMIN — FENTANYL CITRATE 50 MCG: 50 INJECTION, SOLUTION INTRAMUSCULAR; INTRAVENOUS at 18:23

## 2020-08-22 RX ADMIN — SODIUM CHLORIDE 1000 ML: 0.9 INJECTION, SOLUTION INTRAVENOUS at 11:22

## 2020-08-22 RX ADMIN — IOHEXOL 80 ML: 350 INJECTION, SOLUTION INTRAVENOUS at 12:51

## 2020-08-22 RX ADMIN — PHENYLEPHRINE HYDROCHLORIDE 100 MCG: 10 INJECTION INTRAVENOUS at 18:43

## 2020-08-22 RX ADMIN — CEFAZOLIN SODIUM 1000 MG: 1 SOLUTION INTRAVENOUS at 18:30

## 2020-08-22 RX ADMIN — GLYCOPYRROLATE 0.4 MG: 0.2 INJECTION, SOLUTION INTRAMUSCULAR; INTRAVENOUS at 20:47

## 2020-08-22 RX ADMIN — DEXAMETHASONE SODIUM PHOSPHATE 4 MG: 10 INJECTION, SOLUTION INTRAMUSCULAR; INTRAVENOUS at 19:11

## 2020-08-22 RX ADMIN — SODIUM CHLORIDE: 0.9 INJECTION, SOLUTION INTRAVENOUS at 18:35

## 2020-08-22 RX ADMIN — FENTANYL CITRATE 50 MCG: 50 INJECTION, SOLUTION INTRAMUSCULAR; INTRAVENOUS at 18:58

## 2020-08-22 RX ADMIN — SODIUM CHLORIDE, SODIUM LACTATE, POTASSIUM CHLORIDE, AND CALCIUM CHLORIDE: .6; .31; .03; .02 INJECTION, SOLUTION INTRAVENOUS at 20:32

## 2020-08-22 RX ADMIN — NEOSTIGMINE METHYLSULFATE 2 MG: 1 INJECTION, SOLUTION INTRAVENOUS at 20:47

## 2020-08-22 RX ADMIN — ONDANSETRON 4 MG: 2 INJECTION INTRAMUSCULAR; INTRAVENOUS at 20:03

## 2020-08-22 NOTE — ANESTHESIA PREPROCEDURE EVALUATION
Procedure:  LAPAROTOMY EXPLORATORY W/ BOWEL RESECTION (N/A Abdomen)    Relevant Problems   GI/HEPATIC   (+) Intestinal obstruction (HCC)      NEURO/PSYCH   (+) Anxiety   (+) Visual disturbances      Other   (+) Abdominal wall bulge   (+) Tobacco use   40kg, BMI 15     Good functional capacity - able to walk up 3 flights of stairs without cardiopulmonary limitation  Physical Exam    Airway      TM Distance: >3 FB  Neck ROM: full     Dental       Cardiovascular  Cardiovascular exam normal    Pulmonary  Pulmonary exam normal     Other Findings  Visibly anxious, easily distracted      Anesthesia Plan  ASA Score- 2 Emergent    Anesthesia Type- general with ASA Monitors  Additional Monitors:   Airway Plan: ETT  Comment: B/L TAP blocks if open          Plan Factors-Exercise tolerance (METS): >4 METS  Chart reviewed  Patient summary reviewed  Induction- intravenous and rapid sequence induction  Postoperative Plan- Plan for postoperative opioid use  Planned trial extubation    Informed Consent-   I personally reviewed this patient with the CRNA  Discussed and agreed on the Anesthesia Plan with the CRNA  Wilmer Cabral

## 2020-08-22 NOTE — ED ATTENDING ATTESTATION
8/22/2020  IDerik MD, saw and evaluated the patient  I have discussed the patient with the resident/non-physician practitioner and agree with the resident's/non-physician practitioner's findings, Plan of Care, and MDM as documented in the resident's/non-physician practitioner's note, except where noted  All available labs and Radiology studies were reviewed  I was present for key portions of any procedure(s) performed by the resident/non-physician practitioner and I was immediately available to provide assistance  At this point I agree with the current assessment done in the Emergency Department  I have conducted an independent evaluation of this patient a history and physical is as follows:    ED Course         Critical Care Time  Procedures    64 yo female with hx of diverticulitis, no previous abdominal surgery, c/o abdominal pain since Wednesday, but improved yesterday  Pt seen at urgent care yesterday and sent in for concern for sbo  Pt having bowel movements  No n/v  No fever  No urinary symptoms  Pt currently pain free  Vss, afebrile, lungs cta, rrr, abdomen soft nontender  Labs, ct a/p

## 2020-08-22 NOTE — ED PROVIDER NOTES
History  Chief Complaint   Patient presents with    Abdominal Pain     pt seen at urgent care d/t abd pain on wednesday and thursday, was called over night for concerns for a SBO  A 26-year-old female with previous medical history of diverticulitis presenting emergency department with imaging findings concerning for possible SBO  Patient states that she had abdominal pain on Wednesday and Thursday  Abdominal pain was diffuse  No nausea  No vomiting  Normal bowel movement yesterday morning  Evaluated at an urgen care yesterday with negative urinalysis  Obstruction series performed  Concern for SBO on X-ray  Called overnight with result  Patient had a small nonbloody bowel movement yesterday as well as today  No hematochezia  No fevers  Patient denies any recent illnesses  Patient states she took for Imodium AD 6 days ago  She had multiple small bowel movements (not diarrhea) that morningand was concerned she might need to have bowel movements at work; hence why she took the Immodium AD  No history of abdominal surgeries  No hx of SBO  Abdominal Pain   Pain location:  Generalized  Pain quality: aching    Pain radiates to:  Does not radiate  Pain severity:  Mild  Onset quality:  Gradual      Prior to Admission Medications   Prescriptions Last Dose Informant Patient Reported? Taking?   ibuprofen (ADVIL,MOTRIN) 100 MG tablet  Self Yes Yes   Sig: Take 100 mg by mouth every 6 (six) hours as needed for mild pain   mupirocin (BACTROBAN) 2 % ointment  Self No No   Sig: Apply topically 2 (two) times a day   Patient not taking: Reported on 7/14/2020      Facility-Administered Medications: None       Past Medical History:   Diagnosis Date    Anxiety     Seasonal allergies        No past surgical history on file  Family History   Problem Relation Age of Onset    No Known Problems Mother      I have reviewed and agree with the history as documented      E-Cigarette/Vaping E-Cigarette/Vaping Substances     Social History     Tobacco Use    Smoking status: Light Tobacco Smoker    Smokeless tobacco: Never Used   Substance Use Topics    Alcohol use: No     Frequency: Never    Drug use: No        Review of Systems   Gastrointestinal: Positive for abdominal pain  All other systems reviewed and are negative  Physical Exam  ED Triage Vitals   Temperature Pulse Respirations Blood Pressure SpO2   08/22/20 1037 08/22/20 1037 08/22/20 1048 08/22/20 1037 08/22/20 1037   98 7 °F (37 1 °C) 97 18 102/55 95 %      Temp Source Heart Rate Source Patient Position - Orthostatic VS BP Location FiO2 (%)   08/22/20 1037 08/22/20 1037 08/22/20 1200 08/22/20 1200 --   Oral Monitor Sitting Right arm       Pain Score       08/22/20 2056       No Pain             Orthostatic Vital Signs  Vitals:    08/23/20 2100 08/23/20 2347 08/24/20 0310 08/24/20 0646   BP: 110/68 110/66 111/65 112/65   Pulse: 90 84 84 79   Patient Position - Orthostatic VS: Sitting Lying Lying        Physical Exam  Vitals signs and nursing note reviewed  Constitutional:       General: She is not in acute distress  Appearance: She is well-developed  She is not diaphoretic  HENT:      Head: Normocephalic and atraumatic  Right Ear: External ear normal       Left Ear: External ear normal    Eyes:      Conjunctiva/sclera: Conjunctivae normal    Neck:      Vascular: No JVD  Trachea: No tracheal deviation  Cardiovascular:      Rate and Rhythm: Normal rate and regular rhythm  Heart sounds: Normal heart sounds  No murmur  Pulmonary:      Effort: No respiratory distress  Breath sounds: Normal breath sounds  No stridor  No wheezing or rales  Abdominal:      General: Bowel sounds are normal  There is no distension  Palpations: Abdomen is soft  There is no mass  Tenderness: There is no abdominal tenderness  There is no guarding or rebound     Genitourinary:     Comments: Deferred  Musculoskeletal: General: No tenderness or deformity  Skin:     General: Skin is warm and dry  Capillary Refill: Capillary refill takes less than 2 seconds  Coloration: Skin is not pale  Findings: No erythema or rash  Neurological:      Motor: No abnormal muscle tone  Coordination: Coordination normal    Psychiatric:         Behavior: Behavior normal          Thought Content: Thought content normal          Judgment: Judgment normal          ED Medications  Medications   ondansetron (ZOFRAN) injection 4 mg (4 mg Intravenous Given 8/23/20 0411)   heparin (porcine) subcutaneous injection 5,000 Units (5,000 Units Subcutaneous Given 8/24/20 0515)   lactated ringers infusion (75 mL/hr Intravenous Restarted 8/24/20 1001)   naloxone (NARCAN) 0 04 mg/mL syringe 0 04 mg (has no administration in time range)   HYDROmorphone (DILAUDID) 1 mg/mL 50 mL PCA ( Intravenous Restarted 8/24/20 1001)   sodium chloride 0 9 % bolus 1,000 mL (0 mL Intravenous Stopped 8/22/20 1322)   iohexol (OMNIPAQUE) 350 MG/ML injection (MULTI-DOSE) 80 mL (80 mL Intravenous Given 8/22/20 1251)   ceFAZolin (ANCEF) IVPB (premix) 1,000 mg 50 mL ( Intravenous Dose Auto Held 8/23/20 0600)   metroNIDAZOLE (FLAGYL) IVPB (premix) 500 mg 100 mL ( Intravenous Dose Auto Held 8/23/20 0600)   bupivacaine liposomal (EXPAREL) 1 3 % injection 20 mL (13 mL Infiltration Given 8/22/20 2047)       Diagnostic Studies  Results Reviewed     Procedure Component Value Units Date/Time    Platelet count [[id number]]  (Normal) Collected:  08/22/20 1652    Lab Status:  Final result Specimen:  Blood from Arm, Left Updated:  08/22/20 1711     Platelets 000 Thousands/uL      MPV 9 7 fL     Lactic acid, plasma [[id number]]  (Normal) Collected:  08/22/20 1405    Lab Status:  Final result Specimen:  Blood from Arm, Right Updated:  08/22/20 1434     LACTIC ACID 0 9 mmol/L     Narrative:       Result may be elevated if tourniquet was used during collection      CMP [[id number]] (Abnormal) Collected:  08/22/20 1122    Lab Status:  Final result Specimen:  Blood from Arm, Right Updated:  08/22/20 1201     Sodium 138 mmol/L      Potassium 3 8 mmol/L      Chloride 108 mmol/L      CO2 22 mmol/L      ANION GAP 8 mmol/L      BUN 14 mg/dL      Creatinine 0 84 mg/dL      Glucose 102 mg/dL      Calcium 8 8 mg/dL      AST 16 U/L      ALT 11 U/L      Alkaline Phosphatase 87 U/L      Total Protein 6 8 g/dL      Albumin 3 2 g/dL      Total Bilirubin 0 77 mg/dL      eGFR 70 ml/min/1 73sq m     Narrative:       National Kidney Disease Foundation guidelines for Chronic Kidney Disease (CKD):     Stage 1 with normal or high GFR (GFR > 90 mL/min/1 73 square meters)    Stage 2 Mild CKD (GFR = 60-89 mL/min/1 73 square meters)    Stage 3A Moderate CKD (GFR = 45-59 mL/min/1 73 square meters)    Stage 3B Moderate CKD (GFR = 30-44 mL/min/1 73 square meters)    Stage 4 Severe CKD (GFR = 15-29 mL/min/1 73 square meters)    Stage 5 End Stage CKD (GFR <15 mL/min/1 73 square meters)  Note: GFR calculation is accurate only with a steady state creatinine    Lipase [[id number]]  (Abnormal) Collected:  08/22/20 1122    Lab Status:  Final result Specimen:  Blood from Arm, Right Updated:  08/22/20 1201     Lipase 45 u/L     CBC and differential [[id number]]  (Abnormal) Collected:  08/22/20 1122    Lab Status:  Final result Specimen:  Blood from Arm, Right Updated:  08/22/20 1138     WBC 9 59 Thousand/uL      RBC 4 13 Million/uL      Hemoglobin 14 0 g/dL      Hematocrit 42 2 %       fL      MCH 33 9 pg      MCHC 33 2 g/dL      RDW 13 3 %      MPV 9 8 fL      Platelets 422 Thousands/uL      nRBC 0 /100 WBCs      Neutrophils Relative 82 %      Immat GRANS % 0 %      Lymphocytes Relative 9 %      Monocytes Relative 7 %      Eosinophils Relative 2 %      Basophils Relative 0 %      Neutrophils Absolute 7 77 Thousands/µL      Immature Grans Absolute 0 03 Thousand/uL      Lymphocytes Absolute 0 88 Thousands/µL Monocytes Absolute 0 70 Thousand/µL      Eosinophils Absolute 0 19 Thousand/µL      Basophils Absolute 0 02 Thousands/µL                  CT abdomen pelvis with contrast   Final Result by  (08/24 1049)   Addendum 1 of 1 by Yonatan Orr MD (08/22 1642)   ADDENDUM:      Additional review of the images demonstrates a small pocket of fluid and    gas in the mid pelvis just posterior to the uterus which is suspicious for    an extraluminal collection  This measures 2 1 cm on series 2, image 57    and series 602, image 55  Given    the findings of closed loop obstruction and thickened adjacent bowel    loops, this is most consistent with bowel perforation likely related to    ischemia  I personally discussed this study with [address] [place] [address], [address] on 8/22/2020 at    4:31 PM                Final            Procedures  Procedures      ED Course               Identification of Seniors at Risk      Most Recent Value   (ISAR) Identification of Seniors at Risk   Before the illness or injury that brought you to the Emergency, did you need someone to help you on a regular basis? 0 Filed at: 08/22/2020 1046   In the last 24 hours, have you needed more help than usual?  0 Filed at: 08/22/2020 1046   Have you been hospitalized for one or more nights during the past 6 months? 0 Filed at: 08/22/2020 1046   In general, do you see well?  0 Filed at: 08/22/2020 1046   In general, do you have serious problems with your memory?   0 Filed at: 08/22/2020 1046   Do you take more than three different medications every day?  0 Filed at: 08/22/2020 1046   ISAR Score  0 Filed at: 08/22/2020 1046                                  MDM  Number of Diagnoses or Management Options  Bowel obstruction [facility]): new and requires workup  Diagnosis management comments: 38-year-old female with no previous history of bowel obstruction, no history of abdominal surgeries, no history of now on hernias presenting emergency department for concern for small bowel obstruction  Patient has no pain on palpation  No rebound tenderness  Patient appears well on exam   X-ray performed yesterday shows a possible bowel obstruction  Will evaluate with a CT abdomen pelvis with contrast for bowel obstruction  Will evaluate for pancreatitis, liver abnormalities, colitis, diverticulitis  CT shows a probable closed loop bowel obstruction  Will admit to general surgery  Amount and/or Complexity of Data Reviewed  Clinical lab tests: ordered and reviewed  Tests in the radiology section of CPT®: ordered and reviewed  Decide to obtain previous medical records or to obtain history from someone other than the patient: yes  Review and summarize past medical records: yes  Independent visualization of images, tracings, or specimens: yes    Risk of Complications, Morbidity, and/or Mortality  Presenting problems: high  Diagnostic procedures: high  Management options: high          Disposition  Final diagnoses: Bowel obstruction ([facility] )     Time reflects when diagnosis was documented in both MDM as applicable and the Disposition within this note     Time User Action Codes Description Comment    8/22/2020  2:56 PM Juan M Tripathi Add [E70 606] Bowel obstruction ([facility] )     8/22/2020  7:20 PM Fab Pierson Modify [Q31 849] Bowel obstruction ([facility] )     8/22/2020  9:08 PM Juvenal Neves Modify [K56 609] Bowel obstruction [facility])       ED Disposition     ED Disposition Condition Date/Time Comment    Admit Stable Sat Aug 22, 2020  2:56 PM Case was discussed with [facility] and the patient's admission status was agreed to be Admission Status: inpatient status to the service of Dr Sotero Griffith           Follow-up Information    None         Current Discharge Medication List      CONTINUE these medications which have NOT CHANGED    Details   ibuprofen (ADVIL,MOTRIN) 100 MG tablet Take 100 mg by mouth every 6 (six) hours as needed for mild pain      mupirocin (BACTROBAN) 2 % ointment Apply topically 2 (two) times a day  Qty: 22 g, Refills: 1    Associated Diagnoses: Cellulitis of left foot           No discharge procedures on file  PDMP Review     None           ED Provider  Attending physically available and evaluated Kathe Lopez I managed the patient along with the ED Attending      Electronically Signed by         Gabriela Wilcox DO  08/24/20 1040

## 2020-08-22 NOTE — TELEPHONE ENCOUNTER
I called the patient last night at 11:45pm to notify her of the radiology report that she had an obstruction  I informed the patient that she should go to the ER  I told her she should not wait and go get evaluated at the ER that night  She states that she is tired and wants to go to bed and will go in the morning  I again stressed the importance of going that night but pt refused  She is aware to remain NPO and go to the ER  She agreed to go in the morning  Risks were discussed with waiting

## 2020-08-22 NOTE — H&P
H&P Exam - General Surgery   Leighton Gay 65 y o  female MRN: [id number]  Unit/Bed#: ED 21 Encounter: [id number]    Assessment/Plan     Assessment:  66 y/o F without prior Hx of abdominal surgery now presenting with acute closed loop small bowel obstruction  Currently there is no evidence of perforation and WBC is normal  No peritonitis on examination  Plan:  Maintain NPO  IV fluids, lactated ringer's solution 100cc/hr  -proceed to OR for exploratory laparotomy with psb bowel resection  -stat ECG/ Type and screen pre-op  -Prophylaxis with Ancef/Flagyl      History of Present Illness     HPI:  Leighton Gay is a 65 y o  female who presents with a 3 day history of crampy abdominal apin and diminished oral intake  Her last bowel movement wa sover 24 hours ago and was non-bloody  She denies vomiting but has been intermittently nauseous with no appetite since Thursday  She was initially seen at an outside hospital and presents now with concern for bowel obstruction  She denies any past medical or surgical history, and reports taking only occasional advil  She smokes 1-2 cigarettes daily when anxious, does not drink alcohol, and does not take any illicit drugs  She notes an allergy to tetracyclines    Review of Systems   Constitutional: Negative for chills and fever  Cardiovascular: Negative for chest pain  Gastrointestinal: Positive for abdominal pain, constipation and nausea  Psychiatric/Behavioral: The patient is nervous/anxious  All other systems reviewed and are negative  Historical Information   Past Medical History:   Diagnosis Date    Anxiety     Seasonal allergies      No past surgical history on file    Social History   Social History     Substance and Sexual Activity   Alcohol Use No     Social History     Substance and Sexual Activity   Drug Use No     Social History     Tobacco Use   Smoking Status Light Tobacco Smoker   Smokeless Tobacco Never Used     E-Cigarette/Vaping E-Cigarette/Vaping Substances     Family History: non-contributory    Meds/Allergies   all medications and allergies reviewed  Allergies   Allergen Reactions    Tetracyclines & Related Hives       Objective   First Vitals:   Blood Pressure: 102/55 (08/22/20 1037)  Pulse: 97 (08/22/20 1037)  Temperature: 98 7 °F (37 1 °C) (08/22/20 1037)  Temp Source: Oral (08/22/20 1037)  Respirations: 18 (08/22/20 1048)  Height: 5' 4" (162 6 cm) (08/22/20 1037)  Weight - Scale: 40 8 kg (90 lb) (08/22/20 1037)  SpO2: 95 % (08/22/20 1037)    Current Vitals:   Blood Pressure: 112/73 (08/22/20 1430)  Pulse: 74 (08/22/20 1430)  Temperature: 98 7 °F (37 1 °C) (08/22/20 1037)  Temp Source: Oral (08/22/20 1037)  Respirations: 16 (08/22/20 1402)  Height: 5' 4" (162 6 cm) (08/22/20 1037)  Weight - Scale: 40 8 kg (90 lb) (08/22/20 1037)  SpO2: 97 % (08/22/20 1430)      Intake/Output Summary (Last 24 hours) at 8/22/2020 1529  Last data filed at 8/22/2020 1322  Gross per 24 hour   Intake 1000 ml   Output --   Net 1000 ml       Invasive Devices     Peripheral Intravenous Line            Peripheral IV 08/22/20 Right Forearm less than 1 day                Physical Exam  Constitutional:       General: She is not in acute distress  Appearance: She is not ill-appearing  HENT:      Head: Normocephalic and atraumatic  Mouth/Throat:      Mouth: Mucous membranes are moist    Neck:      Musculoskeletal: Normal range of motion and neck supple  Cardiovascular:      Rate and Rhythm: Normal rate and regular rhythm  Pulses:           Radial pulses are 2+ on the right side and 2+ on the left side  Pulmonary:      Effort: Pulmonary effort is normal       Breath sounds: Normal breath sounds  Abdominal:      General: Abdomen is scaphoid  Palpations: Abdomen is soft  Tenderness: There is no abdominal tenderness  There is no guarding or rebound  Musculoskeletal:         General: No swelling or tenderness     Skin:     General: Skin is warm and dry  Capillary Refill: Capillary refill takes less than 2 seconds  Neurological:      General: No focal deficit present  Mental Status: She is alert and oriented to person, place, and time  GCS: GCS eye subscore is 4  GCS verbal subscore is 5  GCS motor subscore is 6  Psychiatric:         Mood and Affect: Mood is anxious  Lab Results:   I have personally reviewed pertinent lab results  , CBC:   Lab Results   Component Value Date    WBC 9 59 08/22/2020    HGB 14 0 08/22/2020    HCT 42 2 08/22/2020     (H) 08/22/2020     08/22/2020    MCH 33 9 08/22/2020    MCHC 33 2 08/22/2020    RDW 13 3 08/22/2020    MPV 9 8 08/22/2020    NRBC 0 08/22/2020   , CMP:   Lab Results   Component Value Date    SODIUM 138 08/22/2020    K 3 8 08/22/2020     08/22/2020    CO2 22 08/22/2020    BUN 14 08/22/2020    CREATININE 0 84 08/22/2020    CALCIUM 8 8 08/22/2020    AST 16 08/22/2020    ALT 11 (L) 08/22/2020    ALKPHOS 87 08/22/2020    EGFR 70 08/22/2020     Imaging: I have personally reviewed pertinent reports  and I have personally reviewed pertinent films in PACS     Ct Abdomen Pelvis With Contrast    Result Date: 8/22/2020  Impression: 1  Small bowel obstruction with transition point in the pelvis  At the transition point, there is swirling mesentery as well as a directly adjacent collapsed loop of small bowel, findings suspicious for a closed loop obstruction  A definite internal hernia is not seen though this could also be related to an adhesive band  2   Mildly thickened loops of small bowel distal to the transition point     No pneumatosis  3   Free fluid in Morison's pouch  No free air 4  Right renal cyst with thin enhancing septation  Nonemergent ultrasound follow-up is recommended   I personally discussed this study with Terell Chaudhry on 8/22/2020 at 1:29 PM   Workstation performed: [id number]     EKG, Pathology, and Other Studies: I have personally reviewed pertinent reports        Code Status: Level 1 - Full Code  Advance Directive and Living Will:      Power of :    POLST:

## 2020-08-23 LAB
ANION GAP SERPL CALCULATED.3IONS-SCNC: 6 MMOL/L (ref 4–13)
BASOPHILS # BLD AUTO: 0.01 THOUSANDS/ΜL (ref 0–0.1)
BASOPHILS NFR BLD AUTO: 0 % (ref 0–1)
BUN SERPL-MCNC: 12 MG/DL (ref 5–25)
CALCIUM SERPL-MCNC: 8.2 MG/DL (ref 8.3–10.1)
CHLORIDE SERPL-SCNC: 111 MMOL/L (ref 100–108)
CO2 SERPL-SCNC: 23 MMOL/L (ref 21–32)
CREAT SERPL-MCNC: 0.55 MG/DL (ref 0.6–1.3)
EOSINOPHIL # BLD AUTO: 0 THOUSAND/ΜL (ref 0–0.61)
EOSINOPHIL NFR BLD AUTO: 0 % (ref 0–6)
ERYTHROCYTE [DISTWIDTH] IN BLOOD BY AUTOMATED COUNT: 13.4 % (ref 11.6–15.1)
GFR SERPL CREATININE-BSD FRML MDRD: 94 ML/MIN/1.73SQ M
GLUCOSE SERPL-MCNC: 101 MG/DL (ref 65–140)
HCT VFR BLD AUTO: 37.2 % (ref 34.8–46.1)
HGB BLD-MCNC: 12.2 G/DL (ref 11.5–15.4)
IMM GRANULOCYTES # BLD AUTO: 0.06 THOUSAND/UL (ref 0–0.2)
IMM GRANULOCYTES NFR BLD AUTO: 1 % (ref 0–2)
LYMPHOCYTES # BLD AUTO: 0.36 THOUSANDS/ΜL (ref 0.6–4.47)
LYMPHOCYTES NFR BLD AUTO: 3 % (ref 14–44)
MAGNESIUM SERPL-MCNC: 2 MG/DL (ref 1.6–2.6)
MCH RBC QN AUTO: 33.8 PG (ref 26.8–34.3)
MCHC RBC AUTO-ENTMCNC: 32.8 G/DL (ref 31.4–37.4)
MCV RBC AUTO: 103 FL (ref 82–98)
MONOCYTES # BLD AUTO: 0.84 THOUSAND/ΜL (ref 0.17–1.22)
MONOCYTES NFR BLD AUTO: 7 % (ref 4–12)
NEUTROPHILS # BLD AUTO: 11.47 THOUSANDS/ΜL (ref 1.85–7.62)
NEUTS SEG NFR BLD AUTO: 89 % (ref 43–75)
NRBC BLD AUTO-RTO: 0 /100 WBCS
PHOSPHATE SERPL-MCNC: 3.4 MG/DL (ref 2.3–4.1)
PLATELET # BLD AUTO: 268 THOUSANDS/UL (ref 149–390)
PMV BLD AUTO: 9.6 FL (ref 8.9–12.7)
POTASSIUM SERPL-SCNC: 4 MMOL/L (ref 3.5–5.3)
RBC # BLD AUTO: 3.61 MILLION/UL (ref 3.81–5.12)
SODIUM SERPL-SCNC: 140 MMOL/L (ref 136–145)
WBC # BLD AUTO: 12.74 THOUSAND/UL (ref 4.31–10.16)

## 2020-08-23 PROCEDURE — NC001 PR NO CHARGE: Performed by: SURGERY

## 2020-08-23 PROCEDURE — 94760 N-INVAS EAR/PLS OXIMETRY 1: CPT

## 2020-08-23 PROCEDURE — 85025 COMPLETE CBC W/AUTO DIFF WBC: CPT | Performed by: SURGERY

## 2020-08-23 PROCEDURE — 99024 POSTOP FOLLOW-UP VISIT: CPT | Performed by: SURGERY

## 2020-08-23 PROCEDURE — 84100 ASSAY OF PHOSPHORUS: CPT | Performed by: SURGERY

## 2020-08-23 PROCEDURE — 97167 OT EVAL HIGH COMPLEX 60 MIN: CPT

## 2020-08-23 PROCEDURE — 80048 BASIC METABOLIC PNL TOTAL CA: CPT | Performed by: SURGERY

## 2020-08-23 PROCEDURE — 83735 ASSAY OF MAGNESIUM: CPT | Performed by: SURGERY

## 2020-08-23 PROCEDURE — 97163 PT EVAL HIGH COMPLEX 45 MIN: CPT

## 2020-08-23 RX ADMIN — HEPARIN SODIUM 5000 UNITS: 5000 INJECTION INTRAVENOUS; SUBCUTANEOUS at 14:24

## 2020-08-23 RX ADMIN — HEPARIN SODIUM 5000 UNITS: 5000 INJECTION INTRAVENOUS; SUBCUTANEOUS at 21:08

## 2020-08-23 RX ADMIN — SODIUM CHLORIDE, SODIUM LACTATE, POTASSIUM CHLORIDE, AND CALCIUM CHLORIDE 75 ML/HR: .6; .31; .03; .02 INJECTION, SOLUTION INTRAVENOUS at 21:02

## 2020-08-23 RX ADMIN — SODIUM CHLORIDE, SODIUM LACTATE, POTASSIUM CHLORIDE, AND CALCIUM CHLORIDE 75 ML/HR: .6; .31; .03; .02 INJECTION, SOLUTION INTRAVENOUS at 07:32

## 2020-08-23 RX ADMIN — HEPARIN SODIUM 5000 UNITS: 5000 INJECTION INTRAVENOUS; SUBCUTANEOUS at 04:13

## 2020-08-23 RX ADMIN — ONDANSETRON 4 MG: 2 INJECTION INTRAMUSCULAR; INTRAVENOUS at 04:11

## 2020-08-23 NOTE — PLAN OF CARE
Problem: PHYSICAL THERAPY ADULT  Goal: Performs mobility at highest level of function for planned discharge setting  See evaluation for individualized goals  Description: Treatment/Interventions: Functional transfer training, LE strengthening/ROM, Elevations, Therapeutic exercise, Endurance training, Patient/family training, Equipment eval/education, Bed mobility, Gait training, Spoke to nursing, OT  Equipment Recommended: Walker(RW)       See flowsheet documentation for full assessment, interventions and recommendations  Note: Prognosis: Fair  Problem List: Decreased strength, Decreased range of motion, Decreased endurance, Impaired balance, Decreased cognition, Impaired judgement, Decreased safety awareness, Pain, Decreased skin integrity  Assessment: Pt is a 65 y o  female presenting to [facility] c/o worsening abdominal pain  Pt was admitted with a primary diagnosis of intestinal obstruction  On 8/22/20, pt underwent LAPAROTOMY EXPLORATORY, REDUCTION OF INTERNAL HERNIA, RESECTION SMALL BOWEL X2  SMALL BOWEL ANASTOMOSIS X2  Pt's PMH affecting eval includes anxiety and personal factors including living alone and steps to negotiate at home  Pt seen for high complexity PT eval due to ongoing medical management of admitting diagnosis, s/p surgical intervention, continuous pulse oximetry monitoring, decreased functional ability and activity tolerance compared to baseline, pain and fall risk  Upon eval, pt was resting in bed c/o 5/10 pain  Pt required Min Ax1 with bed mobility, transfers and ambulation  Pt performed all OOB mobility without use of DME  Pt demonstrates ataxia during ambulation and requires Min Ax1 for LOB  Pt ambulated to bathroom and began to c/o "wooziness"  Pt denies dizziness and believes she is nauseas from her pain medicine  Pt performed toilet transfers with Min Ax1   Based on PT eval, pt is currently limited by decreased BLE strength, decreased ROM, decreased endurance, impaired balance, impaired cognition, decreased skin integrity, pain and fall risk  PT will continue to follow pt for remainder of hospital stay to address these impairments  At conclusion of session, pt was left up in chair with all needs within reach and alarm on  PT currently recommending rehab pending progress  Barriers to Discharge: Decreased caregiver support, Inaccessible home environment     PT Discharge Recommendation: [address], Return to previous environment with social support(Rehab vs home pending progress)     PT - OK to Discharge: Yes(Yes if rehab; no if home pending progress)    See flowsheet documentation for full assessment

## 2020-08-23 NOTE — OP NOTE
OPERATIVE REPORT  PATIENT NAME: Kathy Chilsd    :    MRN: [id number]  Pt Location: BE OR ROOM 08    SURGERY DATE: 2020    Surgeon(s) and Role:     * Lety Johnson MD - Primary     * Ghazal Kyle MD - [address] [place] [address], DO - Assisting    Preop Diagnosis:  Bowel obstruction ([address] ) [W59 417]    Post-Op Diagnosis Codes: * Bowel obstruction (HCC) [K56 609]    Procedure(s) (LRB):  LAPAROTOMY EXPLORATORY, REDUCTION OF INTERNAL HERNIA (N/A)  RESECTION SMALL BOWEL X2  SMALL BOWEL ANASTOMOSIS X2  Specimen(s):  ID Type Source Tests Collected by Time Destination   1 : Portion of Small Bowel Adherent to mass - Proximal  Tissue Other TISSUE EXAM Lety Johnson MD 2020    2 : Mesenteric Mass / Small Bowel - Distal Tissue Other TISSUE EXAM Lety Johnson MD 2020        Estimated Blood Loss:   100 mL    Drains:  Urethral Catheter Latex 16 Fr  (Active)   Number of days: 0       Anesthesia Type:   Choice    Operative Indications: Bowel obstruction ([address] ) [K56 609]      Operative Findings:  Proximal small bowel was stuck on a distal mesenteric mass creating a closed loop obstruction   Adhesion band from cecum to duodenum    Complications:   None    Procedure and Technique:  The patient was taken to the operating room and placed on the operating table in the supine position  General anesthesia was induced and he was intubated  The patients chest and abdomen were clipped of hair and prepped in the standard sterile fashion  He was draped in the standard sterile fashion as well  A surgical time-out was held, and the patient and the planned procedure were reconfirmed with all those present  The patient was given IV antibiotics  An lower midline incision was made  This was carried down to the fascia which was grasped, elevated, and divided under direct vision  We entered the abdomen along the length of the incision  Dilated loops of small bowel were seen     The small bowel was run from the ligament of Treitz to the ileocecal valve, visualizing the serosa of the small bowel in its entirety as well as the mesentery with findings as above  We first took down the band from the cecum to the duodenum using electrocautery  And next we turned our attention to the small bowel  Since the mesenteric mass was stuck to a proximal loop we resected both involved segments  2 segments of bowel 7cm of jejunum and 12 cm of ileum with the mesenteric mass were resected  Stapled anastomosis were performed and the staple lines were lembert'ed over with 3-0 silk  The bowel was run again and no other abnormalities were noted  Abdomen was irrigated, and the fluid was suctioned completely  We watched for return of the hemoperitoneum and did not find any  We then prepared for closure  Closure was accomplished with a inturrupted 1-0 prolene sutures in a figure of 8 fashion  After irrigation, the wound was closed with surgical staples  A sterile dressing was applied  The patient remained was extubated nad taken to PACU in stable condition  There were no complications  Needle, sponge, and instrument counts were each correct as reported to us by the nurse in charge at the termination of the case  Dr Sam Thomas was present for the entire case       Patient Disposition:  PACU     SIGNATURE: Dano Guzman MD  DATE: August 22, 2020  TIME: 8:58 PM

## 2020-08-23 NOTE — RESPIRATORY THERAPY NOTE
Resp Care Note       Pt on PSN with Jason working  No capnography per protocol         08/23/20 1900   Oxygen Therapy/Pulse Ox   O2 Device None (Room air)   Nasal Cannula O2 Flow Rate (L/min) 0 L/min   Calculated FIO2 (%) - Nasal Cannula 20   O2 Flow Rate (L/min) 0 L/min   SpO2 Activity At Rest

## 2020-08-23 NOTE — OCCUPATIONAL THERAPY NOTE
Occupational Therapy Evaluation     Patient Name: Leighton Gay  JRCFO'B Date: 8/23/2020  Problem List  Principal Problem:    Intestinal obstruction [facility])    Past Medical History  Past Medical History:   Diagnosis Date    Anxiety     Seasonal allergies       08/23/20 0825   Note Type   Note type Eval/Treat   Restrictions/Precautions   Weight Bearing Precautions Per Order No   Other Precautions Cognitive; Chair Alarm; Bed Alarm; Fall Risk;Pain   Pain Assessment   Pain Assessment Tool 0-10   Pain Score 5   Pain Location/Orientation Location: Abdomen   Hospital Pain Intervention(s) Repositioned; Ambulation/increased activity; Emotional support;Relaxation technique   Home Living   Type of [address] [facility] One level;Stairs to enter with rails  (FF to 2nd floor apt )   Bathroom Shower/Tub Tub/shower unit   Bathroom Toilet Standard   Bathroom Equipment Grab bars in shower   Prior Function   Level of [place] Independent with ADLs and functional mobility   Lives With Alone   Receives Help From Family   ADL Assistance Independent   IADLs Independent   Falls in the last 6 months 0   Vocational Retired   [address] and mobility - i iadls    Reciprocal Relationships supportive family    Service to Others retired   Intrinsic Gratification active pta   Subjective   Subjective "I can't believe this is happening to me"   ADL   Eating Assistance 7  Independent   Grooming Assistance 5  Supervision/Setup   [address] 4  Minimal Assistance    [facility] [address] 4  [address] [facility]  4  Minimal Assistance   Bed Mobility   Supine to Sit 4  Minimal assistance   Transfers   Sit to Stand 4  Minimal assistance   Stand to Sit 4  Minimal assistance   Stand pivot 4  Minimal assistance   Functional Mobility   Functional Mobility 4  Minimal assistance   Additional items Hand hold assistance   Balance   Static Sitting Fair   Dynamic Sitting Fair -   Static Standing Poor +   Dynamic Standing Poor +   Ambulatory Poor   Activity Tolerance   Activity Tolerance Patient limited by fatigue;Patient limited by pain   RUE Assessment   RUE Assessment WFL   LUE Assessment   LUE Assessment WFL   Cognition   Arousal/Participation Alert; Cooperative   Attention Attends with cues to redirect   Orientation Level Oriented X4   Memory Decreased recall of precautions   Following Commands Follows one step commands with increased time or repetition   Comments extremely anxious - requires redirection to tasks    Assessment   Limitation Decreased ADL status; Decreased Safe judgement during ADL;Decreased endurance;Decreased self-care trans   Prognosis Good   Assessment Pt is a 65 y o  female who was admitted to [facility] on 8/22/2020 with Intestinal obstruction (HCC) s/p ex lap, reduction of internal hernia, resection of small bowel x 2 bowel anastamosis x 2   Pt's problem list also includes PMH of anxiety, seasonal allergies, diverticulitis  At baseline pt was completing adls and mobility independently - I iadls  Pt lives alone in 2nd floor apt with FF to enter  Currently pt requires min assist for overall ADLS and min assist for functional mobility/transfers  Pt currently presents with impairments in the following categories -steps to enter environment, limited home support, difficulty performing ADLS, difficulty performing IADLS , limited insight into deficits, health management  and environment activity tolerance, endurance, standing balance/tolerance, sitting balance/tolerance and safety    These impairments, as well as pt's fatigue, pain, decreased caregiver support, risk for falls and home environment  limit pt's ability to safely engage in all baseline areas of occupation, includingbathing, dressing, toileting, functional mobility/transfers, community mobility, laundry , driving, house maintenance, meal prep, cleaning, social participation  and leisure activities  From OT standpoint, recommend inpt rehab pending progress upon D/C  OT will continue to follow to address the below stated goals  Goals   Patient Goals go home    LTG Time Frame 10-14   Long Term Goal #1 refer to established goals below   Plan   Treatment Interventions ADL retraining;Functional transfer training; Endurance training;Patient/family training;Equipment evaluation/education; Compensatory technique education; Activityengagement   Goal Expiration Date 09/06/20   OT Frequency 3-5x/wk   Recommendation   OT Discharge Recommendation Post-Acute Rehabilitation Services   OT - OK to Discharge Yes   Additional Comments  pending progress as pt has limited support available        OCCUPATIONAL THERAPY GOALS:    *Mod I adls after setup with use of AE PRN  *Mod I toileting and clothing management   *Mod I functional mobility and transfers to/from all surfaces with good dynamic balance and safety for participation in dynamic adls and iadl tasks   *Demonstrate good carryover with safe use of RW during functional tasks   *Assess DME needs   *Increase activity tolerance to 35-40 minutes for participation in adls and enjoyable activities  *Pt to participate in ongoing functional cognitive assessment with good attention/participation to assist with safe d/c recommendations      Zev Jansen, OT

## 2020-08-23 NOTE — RESPIRATORY THERAPY NOTE
Resp Care Note     Pt on continuous pulse ox through functional [place] in room 462  No capnography initiated at this time        08/22/20 2151   Oxygen Therapy/Pulse Ox   O2 Device None (Room air)   Nasal Cannula O2 Flow Rate (L/min) 0 L/min   Calculated FIO2 (%) - Nasal Cannula 20   O2 Flow Rate (L/min) 0 L/min   SpO2 95 %   SpO2 Activity At Rest

## 2020-08-23 NOTE — ANESTHESIA PROCEDURE NOTES
Peripheral Block    Patient location during procedure: holding area  Start time: 8/22/2020 8:47 PM  Reason for block: at surgeon's request and post-op pain management  Staffing  Anesthesiologist: Kathy Dodson MD  Performed: anesthesiologist   Preanesthetic Checklist  Completed: patient identified, site marked, surgical consent, pre-op evaluation, timeout performed, IV checked, risks and benefits discussed and monitors and equipment checked  Peripheral Block  Prep: ChloraPrep and site prepped and draped  Patient monitoring: continuous pulse ox, frequent blood pressure checks, heart rate and cardiac monitor  Block type: TAP  Laterality: bilateral  Injection technique: single-shot  Procedures: ultrasound guided, Ultrasound guidance required for the procedure to increase accuracy and safety of medication placement and decrease risk of complications  Ultrasound permanent image saved  Needle  Needle type: Stimuplex   Needle gauge: 22 G  Needle length: 10 cm  Needle localization: ultrasound guidance  Test dose: negative  Assessment  Injection assessment: incremental injection, local visualized surrounding nerve on ultrasound, negative aspiration for CSF, negative aspiration for heme and no paresthesia on injection  Paresthesia pain: none  Heart rate change: no  Slow fractionated injection: yes  Post-procedure:  site cleaned  patient tolerated the procedure well with no immediate complications  Additional Notes  Only total 13mL of exparel injected, diluted with NS and given in divided doses between the left and right sides

## 2020-08-23 NOTE — PLAN OF CARE
Problem: PAIN - ADULT  Goal: Verbalizes/displays adequate comfort level or baseline comfort level  Description: Interventions:  - Encourage patient to monitor pain and request assistance  - Assess pain using appropriate pain scale  - Administer analgesics based on type and severity of pain and evaluate response  - Implement non-pharmacological measures as appropriate and evaluate response  - Consider cultural and social influences on pain and pain management  - Notify physician/advanced practitioner if interventions unsuccessful or patient reports new pain  Outcome: Progressing     Problem: INFECTION - ADULT  Goal: Absence or prevention of progression during hospitalization  Description: INTERVENTIONS:  - Assess and monitor for signs and symptoms of infection  - Monitor lab/diagnostic results  - Monitor all insertion sites, i e  indwelling lines, tubes, and drains  - Monitor endotracheal if appropriate and nasal secretions for changes in amount and color  - [place] appropriate cooling/warming therapies per order  - Administer medications as ordered  - Instruct and encourage patient and family to use good hand hygiene technique  - Identify and instruct in appropriate isolation precautions for identified infection/condition  Outcome: Progressing  Goal: Absence of fever/infection during neutropenic period  Description: INTERVENTIONS:  - Monitor WBC    Outcome: Progressing     Problem: SAFETY ADULT  Goal: Patient will remain free of falls  Description: INTERVENTIONS:  - Assess patient frequently for physical needs  -  Identify cognitive and physical deficits and behaviors that affect risk of falls    -  [place] fall precautions as indicated by assessment   - Educate patient/family on patient safety including physical limitations  - Instruct patient to call for assistance with activity based on assessment  - Modify environment to reduce risk of injury  - Consider OT/PT consult to assist with strengthening/mobility  Outcome: Progressing  Goal: Maintain or return to baseline ADL function  Description: INTERVENTIONS:  -  Assess patient's ability to carry out ADLs; assess patient's baseline for ADL function and identify physical deficits which impact ability to perform ADLs (bathing, care of mouth/teeth, toileting, grooming, dressing, etc )  - Assess/evaluate cause of self-care deficits   - Assess range of motion  - Assess patient's mobility; develop plan if impaired  - Assess patient's need for assistive devices and provide as appropriate  - Encourage maximum independence but intervene and supervise when necessary  - Involve family in performance of ADLs  - Assess for home care needs following discharge   - Consider OT consult to assist with ADL evaluation and planning for discharge  - Provide patient education as appropriate  Outcome: Progressing  Goal: Maintain or return mobility status to optimal level  Description: INTERVENTIONS:  - Assess patient's baseline mobility status (ambulation, transfers, stairs, etc )    - Identify cognitive and physical deficits and behaviors that affect mobility  - Identify mobility aids required to assist with transfers and/or ambulation (gait belt, sit-to-stand, lift, walker, cane, etc )  - [place] fall precautions as indicated by assessment  - Record patient progress and toleration of activity level on Mobility SBAR; progress patient to next Phase/Stage  - Instruct patient to call for assistance with activity based on assessment  - Consider rehabilitation consult to assist with strengthening/weightbearing, etc   Outcome: Progressing     Problem: DISCHARGE PLANNING  Goal: Discharge to home or other facility with appropriate resources  Description: INTERVENTIONS:  - Identify barriers to discharge w/patient and caregiver  - Arrange for needed discharge resources and transportation as appropriate  - Identify discharge learning needs (meds, wound care, etc )  - Arrange for interpretive services to assist at discharge as needed  - Refer to Case Management Department for coordinating discharge planning if the patient needs post-hospital services based on physician/advanced practitioner order or complex needs related to functional status, cognitive ability, or social support system  Outcome: Progressing     Problem: Knowledge Deficit  Goal: Patient/family/caregiver demonstrates understanding of disease process, treatment plan, medications, and discharge instructions  Description: Complete learning assessment and assess knowledge base    Interventions:  - Provide teaching at level of understanding  - Provide teaching via preferred learning methods  Outcome: Progressing

## 2020-08-23 NOTE — SOCIAL WORK
PT IS NOT A 30 DAYS READMISSION  PT IS NOT A BUNDLE  CM met with pt to discuss DCP and cm role  Pt lives alone in a 3rd floor apt with 3ste  Prior to admission pt was independent with ambulation and with ADLS  No prior hx of VNA  Pt's preference for pharmacy is [facility] [address] [facility]  Pt states she will not need any services at d/c as she plans to return to work at [employer]  Cm advised will continue to follow for discharge needs  Pt denies any hx of drugs/etoh or inpt psych stays  CM reviewed d/c planning process including the following: identifying help at home, patient preference for d/c planning needs, Discharge Lounge, [facility] Meds to Bed program, availability of treatment team to discuss questions or concerns patient and/or family may have regarding understanding medications and recognizing signs and symptoms once discharged  CM also encouraged patient to follow up with all recommended appointments after discharge  Patient advised of importance for patient and family to participate in managing patients medical well being

## 2020-08-23 NOTE — ANESTHESIA POSTPROCEDURE EVALUATION
Post-Op Assessment Note    CV Status:  Stable  Pain Score: 1    Pain management: adequate     Mental Status:  Alert and awake   Hydration Status:  Euvolemic   PONV Controlled:  Controlled   Airway Patency:  Patent  Airway: intubated      Post Op Vitals Reviewed: Yes      Staff: CRNA         No complications documented      BP (P) 134/79 (08/22/20 2056)    Temp (!) (P) 97 °F (36 1 °C) (08/22/20 2056)    Pulse (P) 80 (08/22/20 2056)   Resp (!) (P) 11 (08/22/20 2056)    SpO2

## 2020-08-23 NOTE — PROGRESS NOTES
Progress Note - General Surgery   Shaheed Stabs 65 y o  female MRN: [id number]  Unit/Bed#: -01 Encounter: [id number]    Assessment:  70F s/p ex lap with SBR x 2 for closed loop obstruction caused by adherent mass between bowel loops    Plan:  -NPO/NGT, await bowel function  -control pain  -serial exams  -OOB/ambulate  -continue cordon      Subjective/Objective       Subjective: C/o abdominal pain postop and discomfort from NG tube  Denies nausea, vomiting, fever, chills  Vitals stable    Objective:     Blood pressure 128/74, pulse 82, temperature 98 1 °F (36 7 °C), resp  rate 18, height 5' 4" (1 626 m), weight 40 8 kg (90 lb), SpO2 97 %  ,Body mass index is 15 45 kg/m²  Intake/Output Summary (Last 24 hours) at 8/23/2020 0038  Last data filed at 8/22/2020 2130  Gross per 24 hour   Intake 2900 ml   Output 340 ml   Net 2560 ml       Invasive Devices     Peripheral Intravenous Line            Peripheral IV 08/22/20 Left Arm less than 1 day    Peripheral IV 08/22/20 Right Forearm less than 1 day          Drain            NG/OG/Enteral Tube Nasogastric 1 day    Urethral Catheter Latex 16 Fr  less than 1 day                Physical Exam:  General: NAD  Head: normocephalic, atraumatic, NG in place  CV: Pulse regular  Lungs: no conversational dyspnea  Abdomen: soft, distended, tender near incision, incision c/d/i, no guarding or rebound  Extremities: RODRIGUEZ, motor sensory intact  Neuro: awake, alert, answers questions appropriately      Lab, Imaging and other studies:I have personally reviewed pertinent lab results      VTE Pharmacologic Prophylaxis: Heparin  VTE Mechanical Prophylaxis: sequential compression device

## 2020-08-23 NOTE — PROGRESS NOTES
Progress Note - General Surgery   Chay Payor 65 y o  female MRN: [id number]  Unit/Bed#: -01 Encounter: [id number]    Assessment:  70F s/p ex lap with SBR x 2 for closed loop obstruction caused by adherent mass between bowel loops    Plan:  -NPO/NGT, await bowel function  -control pain/PCA  -serial exams  -OOB/ambulate/PT/OT  -urine output is adequate, consider cordon removal          Subjective/Objective       Subjective: c/o of incisional abdominal pain, instructed on use of PCA  Denies nausea, vomiting, fever, chills  Vitals stable    Objective:     Blood pressure 117/68, pulse 86, temperature 98 1 °F (36 7 °C), resp  rate 18, height 5' 4" (1 626 m), weight 40 8 kg (90 lb), SpO2 94 %  ,Body mass index is 15 45 kg/m²  Intake/Output Summary (Last 24 hours) at 8/23/2020 0541  Last data filed at 8/23/2020 0101  Gross per 24 hour   Intake 2900 ml   Output 340 ml   Net 2560 ml       Invasive Devices     Peripheral Intravenous Line            Peripheral IV 08/22/20 Left Arm less than 1 day          Drain            NG/OG/Enteral Tube Nasogastric 1 day    Urethral Catheter Latex 16 Fr  less than 1 day                Physical Exam:  General: NAD  Head: normocephalic, atraumatic, NG in place  CV: Pulse regular  Lungs: no conversational dyspnea  Abdomen: soft, distended, tender near incision, incision mild ss staining, no guarding or rebound  Extremities: RODRIGUEZ, motor sensory intact  Neuro: awake, alert, answers questions appropriately      Lab, Imaging and other studies:I have personally reviewed pertinent lab results      VTE Pharmacologic Prophylaxis: Heparin  VTE Mechanical Prophylaxis: sequential compression device

## 2020-08-23 NOTE — PHYSICAL THERAPY NOTE
PHYSICAL THERAPY EVALUATION          Patient Name: Kyle Woodr  RLTTL'Y Date: 8/23/2020 08/23/20 0827   Note Type   Note type Eval only   Pain Assessment   Pain Assessment Tool 0-10   Pain Score 5   Pain Location/Orientation Location: Abdomen   Pain Onset/Description Onset: Ongoing; Descriptor: Discomfort   Effect of Pain on Daily Activities Increased pain with activity   Patient's Stated Pain Goal No pain   Hospital Pain Intervention(s) Repositioned; Ambulation/increased activity   Home Living   Type of [address] One level;Stairs to enter with rails  (FF to get to 2nd floor apt, 0 SYLVAIN)   Bathroom Shower/Tub Tub/shower unit   Bathroom Toilet Standard   Bathroom Equipment Grab bars in [facility] Other (Comment)  (Pt denies)   Prior Function   Level of [place] Independent with ADLs and functional mobility   Lives With Alone   Receives Help From Family   ADL Assistance Independent   IADLs Independent   Falls in the last 6 months 0   Vocational Retired   Comments Pt reports being fully independent w/o use of DME prior to admission  Pt lives alone but reports having local friend and cousin that are able to assist prn  Restrictions/Precautions   Weight Bearing Precautions Per Order No   Other Precautions Cognitive; Chair Alarm;Multiple lines; Fall Risk;Pain   General   Family/Caregiver Present No   Cognition   Overall Cognitive Status Impaired   Arousal/Participation Alert   Orientation Level Oriented X4   Memory Within functional limits   Following Commands Follows one step commands with increased time or repetition   RLE Assessment   RLE Assessment WFL   Strength RLE   RLE Overall Strength 3+/5   LLE Assessment   LLE Assessment WFL   Strength LLE   LLE Overall Strength 3+/5   Bed Mobility   Supine to Sit 4  Minimal assistance   Additional items Assist x 1;Bedrails; Increased time required;Verbal cues   Transfers   Sit to Stand 4  Minimal assistance   Additional items Increased time required;Verbal cues   Stand to Sit 4  Minimal assistance   Additional items Armrests; Increased time required   Additional Comments Pt performed transfers w/o use of DME   Ambulation/Elevation   Gait pattern Ataxia; Inconsistent diana   Gait Assistance 4  Minimal assist   Additional items Assist x 1;Verbal cues; Tactile cues   Assistive Device None   Distance 10 ft x2  (limited by nausea )   Stair Management Assistance Not tested   Balance   Static Sitting Fair -   Static Standing Poor +   Ambulatory Poor   Endurance Deficit   Endurance Deficit Yes   Endurance Deficit Description limited by fatigue/nausea and pain   Activity Tolerance   Activity Tolerance Patient limited by pain; Patient limited by fatigue   Medical Staff Made Aware Pt ok to mobilize per nsg   Nurse Made Aware Yes   Assessment   Prognosis Fair   Problem List Decreased strength;Decreased range of motion;Decreased endurance; Impaired balance;Decreased cognition; Impaired judgement;Decreased safety awareness;Pain;Decreased skin integrity   Assessment Pt is a 65 y o  female presenting to [place] [id number] c/o worsening abdominal pain  Pt was admitted with a primary diagnosis of intestinal obstruction  On 8/22/20, pt underwent LAPAROTOMY EXPLORATORY, REDUCTION OF INTERNAL HERNIA, RESECTION SMALL BOWEL X2  SMALL BOWEL ANASTOMOSIS X2  Pt's PMH affecting eval includes anxiety and personal factors including living alone and steps to negotiate at home  Pt seen for high complexity PT eval due to ongoing medical management of admitting diagnosis, s/p surgical intervention, continuous pulse oximetry monitoring, decreased functional ability and activity tolerance compared to baseline, pain and fall risk  Upon eval, pt was resting in bed c/o 5/10 pain  Pt required Min Ax1 with bed mobility, transfers and ambulation   Pt performed all OOB mobility without use of DME  Pt demonstrates ataxia during ambulation and requires Min Ax1 for LOB  Pt ambulated to bathroom and began to c/o "wooziness"  Pt denies dizziness and believes she is nauseas from her pain medicine  Pt performed toilet transfers with Min Ax1  Based on PT eval, pt is currently limited by decreased BLE strength, decreased ROM, decreased endurance, impaired balance, impaired cognition, decreased skin integrity, pain and fall risk  PT will continue to follow pt for remainder of hospital stay to address these impairments  At conclusion of session, pt was left up in chair with all needs within reach and alarm on  PT currently recommending rehab pending progress  Barriers to Discharge Decreased caregiver support; Inaccessible home environment   Goals   Patient Goals To go home   STG Expiration Date 09/02/20   Short Term Goal #1 In 10 days, pt will  Jeremy Hernandez 1) perform all aspects of bed mobility independently in order to reduce caregiver burden  2) perform transfers with Mod I in order to increase functional independence  3) ambulate 150 ft with Mod I and least restrictive AD in order to return to PLOF  4) negotiate FF of steps with Mod I in order to safely enter apartment  5) increase BLE strength by 1/2 grade in order to perform transfers with greater ease  6) increase ambulatory balance by 1 grade in order to reduce fall risk  Plan   Treatment/Interventions Functional transfer training;LE strengthening/ROM; Elevations; Therapeutic exercise; Endurance training;Patient/family training;Equipment eval/education; Bed mobility;Gait training;Spoke to nursing;OT   PT Frequency Other (Comment)  (3-5x/wk)   Recommendation   PT Discharge Recommendation Post-Acute Rehabilitation Services; Return to previous environment with social support  (Rehab vs home pending progress)   Equipment Recommended Walker  (RW)   PT - OK to Discharge Yes  (Yes if rehab; no if home pending progress)   Modified Oneida Scale   Modified Oneida Scale 4 Barthel Index   Feeding 10   Bathing 0   Grooming Score 5   Dressing Score 5   Bladder Score 10   Bowels Score 10   Toilet Use Score 5   Transfers (Bed/Chair) Score 10   Mobility (Level Surface) Score 0   Stairs Score 0   Barthel Index Score 55     Sandrine Urrutia, SPT

## 2020-08-23 NOTE — PLAN OF CARE
Problem: OCCUPATIONAL THERAPY ADULT  Goal: Performs self-care activities at highest level of function for planned discharge setting  See evaluation for individualized goals  Description: Treatment Interventions: ADL retraining, Functional transfer training, Endurance training, Patient/family training, Equipment evaluation/education, Compensatory technique education, Activityengagement          See flowsheet documentation for full assessment, interventions and recommendations  Note: Limitation: Decreased ADL status, Decreased Safe judgement during ADL, Decreased endurance, Decreased self-care trans  Prognosis: Good  Assessment: Pt is a 65 y o  female who was [facility] on [facility]/22/2020 with Intestinal obstruction (HCC) s/p ex lap, reduction of internal hernia, resection of small bowel x 2 bowel anastamosis x 2   Pt's problem list also includes PMH of anxiety, seasonal allergies, diverticulitis  At baseline pt was completing adls and mobility independently - I iadls  Pt lives alone in 2nd floor apt with FF to enter  Currently pt requires min assist for overall ADLS and min assist for functional mobility/transfers  Pt currently presents with impairments in the following categories -steps to enter environment, limited home support, difficulty performing ADLS, difficulty performing IADLS , limited insight into deficits, health management  and environment activity tolerance, endurance, standing balance/tolerance, sitting balance/tolerance and safety   These impairments, as well as pt's fatigue, pain, decreased caregiver support, risk for falls and home environment  limit pt's ability to safely engage in all baseline areas of occupation, includingbathing, dressing, toileting, functional mobility/transfers, community mobility, laundry , driving, house maintenance, meal prep, cleaning, social participation  and leisure activities  From OT standpoint, recommend inpt rehab pending progress upon D/C   OT will continue to follow to address the below stated goals  OT Discharge Recommendation: Post-Acute Rehabilitation Services  OT - OK to Discharge:  Yes

## 2020-08-24 LAB
ANION GAP SERPL CALCULATED.3IONS-SCNC: 10 MMOL/L (ref 4–13)
BASOPHILS # BLD AUTO: 0.02 THOUSANDS/ΜL (ref 0–0.1)
BASOPHILS NFR BLD AUTO: 0 % (ref 0–1)
BUN SERPL-MCNC: 22 MG/DL (ref 5–25)
CALCIUM SERPL-MCNC: 8.2 MG/DL (ref 8.3–10.1)
CHLORIDE SERPL-SCNC: 109 MMOL/L (ref 100–108)
CO2 SERPL-SCNC: 21 MMOL/L (ref 21–32)
CREAT SERPL-MCNC: 0.46 MG/DL (ref 0.6–1.3)
EOSINOPHIL # BLD AUTO: 0.13 THOUSAND/ΜL (ref 0–0.61)
EOSINOPHIL NFR BLD AUTO: 2 % (ref 0–6)
ERYTHROCYTE [DISTWIDTH] IN BLOOD BY AUTOMATED COUNT: 13.5 % (ref 11.6–15.1)
GFR SERPL CREATININE-BSD FRML MDRD: 100 ML/MIN/1.73SQ M
GLUCOSE SERPL-MCNC: 67 MG/DL (ref 65–140)
HCT VFR BLD AUTO: 35.7 % (ref 34.8–46.1)
HGB BLD-MCNC: 11.8 G/DL (ref 11.5–15.4)
IMM GRANULOCYTES # BLD AUTO: 0.03 THOUSAND/UL (ref 0–0.2)
IMM GRANULOCYTES NFR BLD AUTO: 0 % (ref 0–2)
LYMPHOCYTES # BLD AUTO: 0.93 THOUSANDS/ΜL (ref 0.6–4.47)
LYMPHOCYTES NFR BLD AUTO: 11 % (ref 14–44)
MAGNESIUM SERPL-MCNC: 2.2 MG/DL (ref 1.6–2.6)
MCH RBC QN AUTO: 33.6 PG (ref 26.8–34.3)
MCHC RBC AUTO-ENTMCNC: 33.1 G/DL (ref 31.4–37.4)
MCV RBC AUTO: 102 FL (ref 82–98)
MONOCYTES # BLD AUTO: 0.75 THOUSAND/ΜL (ref 0.17–1.22)
MONOCYTES NFR BLD AUTO: 9 % (ref 4–12)
NEUTROPHILS # BLD AUTO: 6.55 THOUSANDS/ΜL (ref 1.85–7.62)
NEUTS SEG NFR BLD AUTO: 78 % (ref 43–75)
NRBC BLD AUTO-RTO: 0 /100 WBCS
PLATELET # BLD AUTO: 238 THOUSANDS/UL (ref 149–390)
PMV BLD AUTO: 10 FL (ref 8.9–12.7)
POTASSIUM SERPL-SCNC: 3.3 MMOL/L (ref 3.5–5.3)
RBC # BLD AUTO: 3.51 MILLION/UL (ref 3.81–5.12)
SODIUM SERPL-SCNC: 140 MMOL/L (ref 136–145)
WBC # BLD AUTO: 8.41 THOUSAND/UL (ref 4.31–10.16)

## 2020-08-24 PROCEDURE — 97116 GAIT TRAINING THERAPY: CPT

## 2020-08-24 PROCEDURE — 97535 SELF CARE MNGMENT TRAINING: CPT

## 2020-08-24 PROCEDURE — 83735 ASSAY OF MAGNESIUM: CPT | Performed by: SURGERY

## 2020-08-24 PROCEDURE — 80048 BASIC METABOLIC PNL TOTAL CA: CPT | Performed by: SURGERY

## 2020-08-24 PROCEDURE — 85025 COMPLETE CBC W/AUTO DIFF WBC: CPT | Performed by: SURGERY

## 2020-08-24 PROCEDURE — 99024 POSTOP FOLLOW-UP VISIT: CPT | Performed by: SURGERY

## 2020-08-24 RX ADMIN — HEPARIN SODIUM 5000 UNITS: 5000 INJECTION INTRAVENOUS; SUBCUTANEOUS at 22:28

## 2020-08-24 RX ADMIN — HEPARIN SODIUM 5000 UNITS: 5000 INJECTION INTRAVENOUS; SUBCUTANEOUS at 14:21

## 2020-08-24 RX ADMIN — HEPARIN SODIUM 5000 UNITS: 5000 INJECTION INTRAVENOUS; SUBCUTANEOUS at 05:15

## 2020-08-24 NOTE — PHYSICAL THERAPY NOTE
PHYSICAL THERAPY Treatment NOTE     Patient Name: Candi Flores  VFGTP'A Date: 8/24/2020 08/24/20 2452   Pain Assessment   Pain Assessment Tool 0-10   Pain Score No Pain   Restrictions/Precautions   Weight Bearing Precautions Per Order No   Other Precautions Cognitive;Multiple lines;Telemetry; Fall Risk   General   Chart Reviewed Yes   Additional Pertinent History Pt is a 66 yo F who presents POD 2 SBR following intestinal obstruction   Family/Caregiver Present No   Cognition   Overall Cognitive Status Impaired   Arousal/Participation Alert   Attention Attends with cues to redirect   Orientation Level Oriented X4   Memory Decreased short term memory   Following Commands Follows one step commands without difficulty   Comments Pt was extremely anxious when asked about previous therapy session  Pt did not recall having physical therapy previously or feeling nausea during gait training  Subjective   Subjective "Im not going to rehab"    Transfers   Sit to Stand 5  Supervision   Additional items Increased time required   Stand to Sit 5  Supervision   Additional items Increased time required   Additional Comments Pt was sitting in recliner at start and end of session  Performed STS transfer with RW with supervision   Ambulation/Elevation   Gait pattern Decreased foot clearance; Forward Flexion; Ataxia   Gait Assistance 5  Supervision   Assistive Device Rolling walker   Distance [de-identified]' x1 with RW  Pt reported feeling too unsteady to perform gait training without AD  Stair training: VC for railing use and to slow down  Pt had 1 LOB episode descending stairs due IV line   Stair Management Assistance 4  Minimal assist   Additional items Assist x 1;Verbal cues   Stair Management Technique One rail R;Alternating pattern; Foreward   Number of Stairs 3  (x3 trials)   Balance   Static Sitting Fair   Dynamic Sitting Fair   Static Standing 0288 F.8 Interactive -   Dynamic Standing Fair -   Ambulatory Fair -   Endurance Deficit   Endurance Deficit Yes   Endurance Deficit Description dyspnea with activity   Activity Tolerance   Activity Tolerance Patient limited by fatigue   Nurse Made Aware Spoke to RN   Assessment   Prognosis Fair   Problem List Decreased strength;Decreased endurance; Impaired balance;Decreased mobility; Decreased cognition   Assessment Pt is a 64 yo F who presents POD 2 SBR following intestinal obstruction  Pt was identified with full name and birthdate  Pt demonstrated improved functional mobility and activity tolerance during today's treatment as shown by ability to perform gait training x80' with RW with supervision  Pt also performed stair training x3 steps x3 trials with R railing use, requiring MinAx1 for cueing on railing, to slow down, and for 1 LOB episode due to IV line  Pt would continue to benefit from IP PT to progress their functional mobility, prevent secondary complications associated with hospital stay, and to aid in safe discharge to reduce the risk of readmission  D/C recommendation is to Home with social support  Plan   Treatment/Interventions Functional transfer training;LE strengthening/ROM; Elevations; Therapeutic exercise; Endurance training;Spoke to nursing;OT;Bed mobility;Gait training;Equipment eval/education   Progress Progressing toward goals   PT Frequency   (3-5x/wk)   Recommendation   PT Discharge Recommendation Return to previous environment with social support   Equipment Recommended Yudi Arteaga   PT - OK to Discharge Yes   Additional Comments when medically appropriate     Baron Vasquez, SPT 8/24/2020

## 2020-08-24 NOTE — PLAN OF CARE
Problem: OCCUPATIONAL THERAPY ADULT  Goal: Performs self-care activities at highest level of function for planned discharge setting  See evaluation for individualized goals  Description: Treatment Interventions: ADL retraining, Functional transfer training, Endurance training, Patient/family training, Equipment evaluation/education, Compensatory technique education, Activityengagement          See flowsheet documentation for full assessment, interventions and recommendations  Outcome: Progressing  Note: Limitation: Decreased ADL status, Decreased Safe judgement during ADL, Decreased endurance, Decreased self-care trans  Prognosis: Good  Assessment: Pt participated in occupational therapy with focus on activity tolerance, functional transfers/mob, standing tolerance and  balance for pt engagement in functional self-care task/oral hygiene and UB/LB self-care tasks  Pt cleared by Austin Payne for pt participation in occupational therapy  Pt received sitting out of bed to bedside chair and agreeable to therapy following pt Identifiers confirmed  Pt report her goal was to go 0822- 0911 and [id number]  Pt seen in two parts this treatment date  She is progressing well and able to complete functional transfers with SBA  Pt required assist for LB dressing/donning pants after OT set up and instruction  Pt able to tolerate standing for oral hygiene with no complaints of pain  Pt reports abdominal discomfort and denies pain  Post acute rehab services to continue to address these above noted pt deficits which currently impair pt ADL and functional mob vs home pending pt progress with physical therapy  Pt chair alarm active post session all needs within reach  OT Discharge Recommendation: Post-Acute Rehabilitation Services(vs home pending pt progress with physical therapy )  OT - OK to Discharge:  Yes

## 2020-08-24 NOTE — PLAN OF CARE
Problem: PHYSICAL THERAPY ADULT  Goal: Performs mobility at highest level of function for planned discharge setting  See evaluation for individualized goals  Description: Treatment/Interventions: Functional transfer training, LE strengthening/ROM, Elevations, Therapeutic exercise, Endurance training, Patient/family training, Equipment eval/education, Bed mobility, Gait training, Spoke to nursing, OT  Equipment Recommended: Walker(RW)       See flowsheet documentation for full assessment, interventions and recommendations  Outcome: Progressing  Note: Prognosis: Fair  Problem List: Decreased strength, Decreased endurance, Impaired balance, Decreased mobility, Decreased cognition  Assessment: Pt is a 66 yo F who presents POD 2 SBR following intestinal obstruction  Pt was identified with full name and birthdate  Pt demonstrated improved functional mobility and activity tolerance during today's treatment as shown by ability to perform gait training x80' with RW with supervision  Pt also performed stair training x3 steps x3 trials with R railing use, requiring MinAx1 for cueing on railing, to slow down, and for 1 LOB episode due to IV line  Pt would continue to benefit from IP PT to progress their functional mobility, prevent secondary complications associated with hospital stay, and to aid in safe discharge to reduce the risk of readmission  D/C recommendation is to Home with social support  Barriers to Discharge: Decreased caregiver support, Inaccessible home environment     PT Discharge Recommendation: Return to previous environment with social support     PT - OK to Discharge: Yes    See flowsheet documentation for full assessment

## 2020-08-24 NOTE — PROGRESS NOTES
Progress Note - Slime Acosta 65 y o  female MRN: [id number]    Unit/Bed#: -01 Encounter: [id number]      Assessment:  70F s/p ex lap with SBR x 2 for closed loop obstruction caused by adherent mass between bowel loops    Vss  Afebrile  abd is soft, nontender, mild distension  NGT: 140cc output last 24 h  Underweight with BMI-15 45 in a patient with medical history of diverticulitis presenting with SBO  Presently with sips of clear liquid diet  Findings: BMI-15 45       Plan:  Remove NGT  Start sips of clears  Ambulate  dvt ppx    Subjective:   Feeling well  Complaining of discomfort from NGT in her nares  Denied passing flatus  Denied chest pain, shortness of breath, fever, chills  Objective:     Vitals: Blood pressure 111/65, pulse 84, temperature 98 2 °F (36 8 °C), temperature source Oral, resp  rate 18, height 5' 4" (1 626 m), weight 40 8 kg (90 lb), SpO2 94 %  ,Body mass index is 15 45 kg/m²  Intake/Output Summary (Last 24 hours) at 8/24/2020 0629  Last data filed at 8/24/2020 0600  Gross per 24 hour   Intake 1982 ml   Output 465 ml   Net 1517 ml       Physical Exam  General: NAD  HEENT: NC/AT  MMM  Cv: RRR     Lungs: normal effort  Ab: Soft, NT/ND  Ex: no CCE  Neuro: AAOx3    Scheduled Meds:  Current Facility-Administered Medications   Medication Dose Route Frequency Provider Last Rate    heparin (porcine)  5,000 Units Subcutaneous [facility] Lava, DO      HYDROmorphone   Intravenous Continuous Jesus Alberto Lava, DO      lactated ringers  75 mL/hr Intravenous Continuous Jesus Alberto Lava, DO 75 mL/hr (08/23/20 2102)    naloxone  0 04 mg Intravenous Q1MIN PRN Jesus Alberto Lava, DO      ondansetron  4 mg Intravenous Q6H PRN Jesus Alberto Lava, DO       Continuous Infusions:HYDROmorphone,   lactated ringers, 75 mL/hr, Last Rate: 75 mL/hr (08/23/20 2102)      PRN Meds: naloxone    ondansetron      Invasive Devices     Peripheral Intravenous Line            Peripheral IV 08/22/20 Left Arm 1 day Drain            NG/OG/Enteral Tube Nasogastric 2 days                Lab, Imaging and other studies: I have personally reviewed pertinent reports      VTE Pharmacologic Prophylaxis: Heparin  VTE Mechanical Prophylaxis: sequential compression device

## 2020-08-24 NOTE — UTILIZATION REVIEW
Initial Clinical Review    Admission: Date/Time/Statement:   Admission Orders (From admission, onward)     Ordered        08/22/20 1528  Inpatient Admission  Once                   Orders Placed This Encounter   Procedures    Inpatient Admission     Standing Status:   Standing     Number of Occurrences:   1     Order Specific Question:   Admitting Physician     Answer:   Jayme Sosa     Order Specific Question:   Level of Care     Answer:   Med Surg [16]     Order Specific Question:   Estimated length of stay     Answer:   More than 2 Midnights     Order Specific Question:   Certification     Answer:   I certify that inpatient services are medically necessary for this patient for a duration of greater than two midnights  See H&P and MD Progress Notes for additional information about the patient's course of treatment  ED Arrival Information     Expected Arrival Acuity Means of Arrival Escorted By Service Admission Type    - 8/22/2020 10:22 Urgent [facility]-General Urgent    Arrival Complaint    abnormal lab         Chief Complaint   Patient presents with    Abdominal Pain     pt seen at urgent care d/t abd pain on wednesday and thursday, was called over night for concerns for a SBO  Assessment/Plan:   65 y o  female to ED presents with a 3 day history of crampy abdominal apin and diminished oral intake  Her last bowel movement was over 24 hours ago and was non-bloody  Intermittently nauseous with no appetite since Thursday  She was initially seen at an outside hospital and presents now with concern for bowel obstruction  Smokes 1-2 cigarettes daily when anxious  PMH for Abdominal surgery  Admit Inpatient level of care for acute closed loop small bowel obstruction  Maintain NPO  Iv Fluids  Plan for OR Today - Exploratory laparotomy with psb bowel resection   Stat ECG    8/23 Progress notes; S/p ex lap with SBR x 2 for closed loop obstruction caused by adherent mass between bowel loops  NPO/ NGT, await bowel function  Pain control/ PCA pump  Serial exams         8/22 OR - S/P LAPAROTOMY EXPLORATORY, REDUCTION OF INTERNAL HERNIA (N/A)  RESECTION SMALL BOWEL X2  SMALL BOWEL ANASTOMOSIS X2  Operative Findings:  Proximal small bowel was stuck on a distal mesenteric mass creating a closed loop obstruction   Adhesion band from cecum to duodenum     ED Triage Vitals   Temperature Pulse Respirations Blood Pressure SpO2   08/22/20 1037 08/22/20 1037 08/22/20 1048 08/22/20 1037 08/22/20 1037   98 7 °F (37 1 °C) 97 18 102/55 95 %      Temp Source Heart Rate Source Patient Position - Orthostatic VS BP Location FiO2 (%)   08/22/20 1037 08/22/20 1037 08/22/20 1200 08/22/20 1200 --   Oral Monitor Sitting Right arm       Pain Score       08/22/20 2056       No Pain          Wt Readings from Last 1 Encounters:   08/22/20 40 8 kg (90 lb)     Additional Vital Signs:   08/23/20 06:59:26   98 5 °F (36 9 °C)   82   18   114/67   83   96 %   --   --   --   --   --    08/23/20 02:53:48   --   86   18   117/68   84   94 %   --   --   --   --   --    08/23/20 01:40:23   --   79   18   121/71   88   96 %   --   --   --   --   --    08/23/20 00:46:56   --   80   18   127/74   92   97 %   --   --   --   --   --    08/22/20 23:46:32   98 1 °F (36 7 °C)   82   18   128/74   92   97 %   --   --   --   None (Room air)   Lying    08/22/20 22:43:53   --   84   18   131/75   94   95 %   --   --   --   None (Room air)   --    08/22/20 21:51:52   --   80   16   133/75   94   95 %   20   0 L/min   0 L/min   None (Room air)   --    08/22/20 2130   97 7 °F (36 5 °C)   80   13   135/62   --   98 %   32   --   3 L/min   Nasal cannula   --    08/22/20 2115   --   82   14   135/62   --   96                  Pertinent Labs/Diagnostic Test Results:   8/22 CT Abd/Pelvis - Small bowel obstruction with transition point in the pelvis    At the transition point, there is swirling mesentery as well as a directly adjacent collapsed loop of small bowel, findings suspicious for a closed loop obstruction  A definite internal hernia is not seen though this could also be related to an adhesive band      2   Mildly thickened loops of small bowel distal to the transition point     No pneumatosis      3  Free fluid in Morison's pouch  No free air     4  Right renal cyst with thin enhancing septation            Results from last 7 days   Lab Units 08/24/20  0457 08/23/20  0524 08/22/20  1652 08/22/20  1122   WBC Thousand/uL 8 41 12 74*  --  9 59   HEMOGLOBIN g/dL 11 8 12 2  --  14 0   HEMATOCRIT % 35 7 37 2  --  42 2   PLATELETS Thousands/uL 238 268 300 308   NEUTROS ABS Thousands/µL 6 55 11 47*  --  7 77*         Results from last 7 days   Lab Units 08/24/20  0457 08/23/20  0524 08/22/20  1122   SODIUM mmol/L 140 140 138   POTASSIUM mmol/L 3 3* 4 0 3 8   CHLORIDE mmol/L 109* 111* 108   CO2 mmol/L 21 23 22   ANION GAP mmol/L 10 6 8   BUN mg/dL 22 12 14   CREATININE mg/dL 0 46* 0 55* 0 84   EGFR ml/min/1 73sq m 100 94 70   CALCIUM mg/dL 8 2* 8 2* 8 8   MAGNESIUM mg/dL 2 2 2 0  --    PHOSPHORUS mg/dL  --  3 4  --      Results from last 7 days   Lab Units 08/22/20  1122   AST U/L 16   ALT U/L 11*   ALK PHOS U/L 87   TOTAL PROTEIN g/dL 6 8   ALBUMIN g/dL 3 2*   TOTAL BILIRUBIN mg/dL 0 77         Results from last 7 days   Lab Units 08/24/20  0457 08/23/20  0524 08/22/20  1122   GLUCOSE RANDOM mg/dL 67 101 102       Results from last 7 days   Lab Units 08/22/20  1405   LACTIC ACID mmol/L 0 9     Results from last 7 days   Lab Units 08/22/20  1122   LIPASE u/L 45*             Results from last 7 days   Lab Units 08/21/20  1842   CLARITY UA  clear   COLOR UA  yellow   GLUCOSE UA  neg   KETONES UA  neg   BLOOD UA  mod   PROTEIN UA  trace   NITRITE UA  neg   BILIRUBIN UA POC  neg   UROBILINOGEN UA  0 2   LEUKOCYTES UA  neg       ED Treatment:   Medication Administration from 08/22/2020 1022 to 08/22/2020 1619       Date/Time Order Dose Route Action     08/22/2020 1122 sodium chloride 0 9 % bolus 1,000 mL 1,000 mL Intravenous New Bag     08/22/2020 1251 iohexol (OMNIPAQUE) 350 MG/ML injection (MULTI-DOSE) 80 mL 80 mL Intravenous Given        Past Medical History:   Diagnosis Date    Anxiety     Seasonal allergies      Present on Admission:  **None**      Admitting Diagnosis: Bowel obstruction (HCC) [K56 609]  Abnormal blood chemistry test [R79 9]  Age/Sex: 65 y o  female  Admission Orders:  Scheduled Medications:  heparin (porcine), 5,000 Units, Subcutaneous, Q8H [facility]      Continuous IV Infusions:  HYDROmorphone, , Intravenous, Continuous  lactated ringers, 75 mL/hr, Intravenous, Continuous      PRN Meds:  naloxone, 0 04 mg, Intravenous, Q1MIN PRN  ondansetron, 4 mg, Intravenous, Q6H PRN 8/23 x1      NPO      Network Utilization Review Department  [email or web address]  ATTENTION: Please call with any questions or concerns to [phone] and carefully listen to the prompts so that you are directed to the right person  All voicemails are confidential   [facility] all requests for admission clinical reviews, approved or denied determinations and any other requests to dedicated fax number below belonging to the campus where the patient is receiving treatment   List of dedicated fax numbers for the Facilities:  [address] [place] [address] DENIALS (Administrative/Medical Necessity) [phone]   [address] (Maternity/NICU/Pediatrics) [phone]   Tamera Fines [phone]   [facility] [phone]   [facility] [phone]   Lauren Hernandez [phone]   [facility] [address] [phone]   [facility]  [phone]   [address] [facility]  [address] [facility] [address] [facility] [phone]

## 2020-08-24 NOTE — PLAN OF CARE
Suggest Ensure Clear TID, apple or berry flavor    Problem: Nutrition/Hydration-ADULT  Goal: Nutrient/Hydration intake appropriate for improving, restoring or maintaining nutritional needs  Description: Monitor and assess patient's nutrition/hydration status for malnutrition  Collaborate with interdisciplinary team and initiate plan and interventions as ordered  Monitor patient's weight and dietary intake as ordered or per policy  Utilize nutrition screening tool and intervene as necessary  Determine patient's food preferences and provide high-protein, high-caloric foods as appropriate       INTERVENTIONS:  - Monitor oral intake, urinary output, labs, and treatment plans  - Assess nutrition and hydration status and recommend course of action  - Evaluate amount of meals eaten  - Assist patient with eating if necessary   - Allow adequate time for meals  - Recommend/ encourage appropriate diets, oral nutritional supplements, and vitamin/mineral supplements  - Order, calculate, and assess calorie counts as needed  - Recommend, monitor, and adjust tube feedings and TPN/PPN based on assessed needs  - Assess need for intravenous fluids  - Provide specific nutrition/hydration education as appropriate  - Include patient/family/caregiver in decisions related to nutrition  Outcome: Progressing

## 2020-08-24 NOTE — OCCUPATIONAL THERAPY NOTE
Occupational Therapy Treatment Note     08/24/20 1210   Restrictions/Precautions   Weight Bearing Precautions Per Order No   Other Precautions Cognitive; Chair Alarm; Fall Risk;Pain;Multiple lines   Lifestyle   Autonomy I adls and mobility - i iadls    Reciprocal Relationships supportive family    Service to Others retired   Intrinsic Gratification active pta   Pain Assessment   Pain Assessment Tool 0-10   Pain Score No Pain   ADL   Where Assessed Other (Comment)  (standing at pt chair )   Grooming Assistance 5  Supervision/Setup   Grooming Deficit Teeth care   UB Dressing Assistance 3  Moderate Assistance   UB Dressing Deficit Thread RUE; Thread LUE;Pull around back   LB Dressing Assistance 3  Moderate Assistance   LB Dressing Deficit Thread RLE into pants; Thread LLE into pants   Transfers   Sit to Stand 5  Supervision   Additional items Increased time required   Stand to Sit 5  Supervision   Additional items Increased time required   Functional Mobility   Functional Mobility 5  Supervision   Additional items Rolling walker   Cognition   Overall Cognitive Status Impaired   Arousal/Participation Alert; Cooperative   Attention Attends with cues to redirect   Orientation Level Oriented X4   Memory Within functional limits   Following Commands Follows one step commands with increased time or repetition   Assessment   Assessment Pt participated in occupational therapy with focus on activity tolerance, functional transfers/mob, standing tolerance and  balance for pt engagement in functional self-care task/oral hygiene and UB/LB self-care tasks  Pt cleared by Apple Ny for pt participation in occupational therapy  Pt received sitting out of bed to bedside chair and agreeable to therapy following pt Identifiers confirmed  Pt report her goal was to go 0822- 0911 and [id number]  Pt seen in two parts this treatment date  She is progressing well and able to complete functional transfers with SBA   Pt required assist for LB dressing/donning pants after OT set up and instruction  Pt able to tolerate standing for oral hygiene with no complaints of pain  Pt reports abdominal discomfort and denies pain  Post acute rehab services to continue to address these above noted pt deficits which currently impair pt ADL and functional mob vs home pending pt progress with physical therapy  Pt chair alarm active post session all needs within reach  Plan   Treatment Interventions ADL retraining;Functional transfer training; Endurance training;Patient/family training   Goal Expiration Date 09/06/20   OT Treatment Day 1   OT Frequency 3-5x/wk   Recommendation   OT Discharge Recommendation Post-Acute Rehabilitation Services  (vs home pending pt progress with physical therapy )   Barthel Index   Feeding 10   Bathing 0   Grooming Score 5   Dressing Score 5   Bladder Score 10   Bowels Score 10   Toilet Use Score 5   Transfers (Bed/Chair) Score 10   Mobility (Level Surface) Score 10   Stairs Score 0   Barthel Index Score 65       Megha Golden  BELTRAN/L

## 2020-08-25 VITALS
HEART RATE: 83 BPM | HEIGHT: 64 IN | DIASTOLIC BLOOD PRESSURE: 81 MMHG | RESPIRATION RATE: 18 BRPM | SYSTOLIC BLOOD PRESSURE: 128 MMHG | OXYGEN SATURATION: 95 % | TEMPERATURE: 97.9 F | BODY MASS INDEX: 15.36 KG/M2 | WEIGHT: 90 LBS

## 2020-08-25 PROCEDURE — NC001 PR NO CHARGE: Performed by: SURGERY

## 2020-08-25 PROCEDURE — 99024 POSTOP FOLLOW-UP VISIT: CPT | Performed by: SURGERY

## 2020-08-25 RX ORDER — HYDROMORPHONE HCL/PF 1 MG/ML
0.2 SYRINGE (ML) INJECTION
Status: DISCONTINUED | OUTPATIENT
Start: 2020-08-25 | End: 2020-08-25 | Stop reason: HOSPADM

## 2020-08-25 RX ORDER — POTASSIUM CHLORIDE 20 MEQ/1
40 TABLET, EXTENDED RELEASE ORAL ONCE
Status: COMPLETED | OUTPATIENT
Start: 2020-08-25 | End: 2020-08-25

## 2020-08-25 RX ORDER — OXYCODONE HYDROCHLORIDE 5 MG/1
2.5 TABLET ORAL EVERY 4 HOURS PRN
Status: DISCONTINUED | OUTPATIENT
Start: 2020-08-25 | End: 2020-08-25 | Stop reason: HOSPADM

## 2020-08-25 RX ORDER — OXYCODONE HYDROCHLORIDE 5 MG/1
5 TABLET ORAL EVERY 4 HOURS PRN
Status: DISCONTINUED | OUTPATIENT
Start: 2020-08-25 | End: 2020-08-25 | Stop reason: HOSPADM

## 2020-08-25 RX ADMIN — SODIUM CHLORIDE, SODIUM LACTATE, POTASSIUM CHLORIDE, AND CALCIUM CHLORIDE 75 ML/HR: .6; .31; .03; .02 INJECTION, SOLUTION INTRAVENOUS at 00:12

## 2020-08-25 RX ADMIN — HEPARIN SODIUM 5000 UNITS: 5000 INJECTION INTRAVENOUS; SUBCUTANEOUS at 05:58

## 2020-08-25 RX ADMIN — POTASSIUM CHLORIDE 40 MEQ: 1500 TABLET, EXTENDED RELEASE ORAL at 10:22

## 2020-08-25 NOTE — DISCHARGE SUMMARY
Discharge Summary - Alfonso Pouch 65 y o  female MRN: [id number]    Unit/Bed#: -01 Encounter: [id number]    Admission Date:   Admission Orders (From admission, onward)     Ordered        08/22/20 1528  Inpatient Admission  Once                     Admitting Diagnosis: Bowel obstruction ([address] ) [K56 609]  Abnormal blood chemistry test [R79 9]    HPI: Pt Is a 68-year-old female who initially presented with 3 days of abdominal pain and decreased appetite  Endorsed nausea, denied any vomiting  Denied any prior medical or surgical history  CT imaging on 08/22 consistent with transition point, swelling in the mesentery, suspicious for closed loop obstruction  Procedures Performed: No orders of the defined types were placed in this encounter  Summary of Hospital Course:  Patient was admitted to the acute care surgery service on 08/22/2020  Patient was taken to the operating room on 08/22 for exploratory laparotomy reduction of internal hernia resection of the small bowel and small bowel anastomosis x2  Patient was taken to PACU in stable condition, transferred medical surgery yu where she recovered further without complications  Patients NG tube was removed, she was tolerating a diet, and having bowel function, however her abdomen remained distended on exam on 08/25, and she required further observation per surgical team however patient left against medical advice on 08/25/2020  Significant Findings, Care, Treatment and Services Provided: none    Complications: none  Left ama    Discharge Diagnosis: same    Resolved Problems  Date Reviewed: 8/21/2020    None          Condition at Discharge: good         Discharge instructions/Information to patient and family:   See after visit summary for information provided to patient and family  Provisions for Follow-Up Care:  See after visit summary for information related to follow-up care and any pertinent home health orders        PCP: Doug Hirsch Jeanne Galeana MD    Disposition: See After Visit Summary for discharge disposition information  Planned Readmission: No      Discharge Statement   I spent 30 minutes discharging the patient  This time was spent on the day of discharge  I had direct contact with the patient on the day of discharge  Additional documentation is required if more than 30 minutes were spent on discharge  Discharge Medications:  See after visit summary for reconciled discharge medications provided to patient and family

## 2020-08-25 NOTE — SOCIAL WORK
Patient reviewed during care coordination rounds  NG tube removed, need to advance diet prior to discharge  Pt is recommended for home discharge by therapy

## 2020-08-25 NOTE — PROGRESS NOTES
Patient decided to leave AMA after speaking with physician from [facility] who tried to convince the patient to stay  AMA paperwork was signed by the patient and placed in the patient's chart  Viki Ladd Resident with [facility] made aware via [employer]

## 2020-08-25 NOTE — QUICK NOTE
Nurse-Patient-Provider rounds were completed with the patient's nurse today, Yong Harris  We discussed the plan is to continue her diet as tolerated and monitor her abdominal exam and for continued bowel function  She is passing flatus, but has not yet had a bowel movement  She did tolerate regular food for lunch today without any bloating, nausea or vomiting  She has minimal pain, only with movement  She is anxious to try to leave today if possible  We reviewed all of the invasive devices/lines/telemetry orders   - None  DVT Prophylaxis:  Subcutaneous heparin and SCDs  Pain Assessment / Plan:  - Continue current analgesic regimen  Mobility Assessment / Plan:  - Activity as tolerated  Goals / Barriers for discharge:  - Anticipate discharge within next 24 hours of continues to tolerate diet and has continued bowel function  All questions and concerns were addressed  I spent greater than 10 minutes reviewing the plan with the patient and the nurse, and coordinating care for the day      Minnie Pascal PA-C  8/25/2020 12:45 PM

## 2020-08-25 NOTE — PROGRESS NOTES
Progress Note - Leighton Factor 65 y o  female MRN: [id number]    Unit/Bed#: -01 Encounter: [id number]      Assessment:  70F s/p ex lap with SBR x 2 for closed loop obstruction caused by adherent mass between bowel loops      Plan:  Advance to regular diet  Discontinue IVF  Discontinue PCA and start oral regimen  Ambulate  dvt ppx    Subjective:   No acute events  Tolerating clears  Passing flatus  Objective:     Vitals: Blood pressure 124/76, pulse 76, temperature 98 5 °F (36 9 °C), resp  rate 18, height 5' 4" (1 626 m), weight 40 8 kg (90 lb), SpO2 (!) 89 %  ,Body mass index is 15 45 kg/m²  Intake/Output Summary (Last 24 hours) at 8/25/2020 0554  Last data filed at 8/25/2020 0235  Gross per 24 hour   Intake 2431 65 ml   Output 1000 ml   Net 1431 65 ml       Physical Exam  GEN: NAD  HEENT: MMM  CV: RRR  Lung: normal effort  Ab: Soft, NT, distended   Extrem: No CCE  Neuro: A+Ox3, motor and sensation grossly intact    Scheduled Meds:  Current Facility-Administered Medications   Medication Dose Route Frequency Provider Last Rate    heparin (porcine)  5,000 Units Subcutaneous [facility] [place], DO      HYDROmorphone   Intravenous Continuous [place], DO      lactated ringers  75 mL/hr Intravenous Continuous [place], DO 75 mL/hr (08/25/20 0012)    naloxone  0 04 mg Intravenous Q1MIN PRN [place], DO      ondansetron  4 mg Intravenous Q6H PRN [place], DO       Continuous Infusions:HYDROmorphone,   lactated ringers, 75 mL/hr, Last Rate: 75 mL/hr (08/25/20 0012)      PRN Meds: naloxone    ondansetron      Invasive Devices     Peripheral Intravenous Line            Peripheral IV 08/24/20 Dorsal (posterior); Right Forearm less than 1 day                Lab, Imaging and other studies: I have personally reviewed pertinent reports      VTE Pharmacologic Prophylaxis: Heparin  VTE Mechanical Prophylaxis: sequential compression device

## 2020-08-25 NOTE — PLAN OF CARE
Problem: PAIN - ADULT  Goal: Verbalizes/displays adequate comfort level or baseline comfort level  Description: Interventions:  - Encourage patient to monitor pain and request assistance  - Assess pain using appropriate pain scale  - Administer analgesics based on type and severity of pain and evaluate response  - Implement non-pharmacological measures as appropriate and evaluate response  - Consider cultural and social influences on pain and pain management  - Notify physician/advanced practitioner if interventions unsuccessful or patient reports new pain  Outcome: Completed     Problem: INFECTION - ADULT  Goal: Absence or prevention of progression during hospitalization  Description: INTERVENTIONS:  - Assess and monitor for signs and symptoms of infection  - Monitor lab/diagnostic results  - Monitor all insertion sites, i e  indwelling lines, tubes, and drains  - Monitor endotracheal if appropriate and nasal secretions for changes in amount and color  - [place] appropriate cooling/warming therapies per order  - Administer medications as ordered  - Instruct and encourage patient and family to use good hand hygiene technique  - Identify and instruct in appropriate isolation precautions for identified infection/condition  Outcome: Completed  Goal: Absence of fever/infection during neutropenic period  Description: INTERVENTIONS:  - Monitor WBC    Outcome: Completed     Problem: SAFETY ADULT  Goal: Patient will remain free of falls  Description: INTERVENTIONS:  - Assess patient frequently for physical needs  -  Identify cognitive and physical deficits and behaviors that affect risk of falls    -  [place] fall precautions as indicated by assessment   - Educate patient/family on patient safety including physical limitations  - Instruct patient to call for assistance with activity based on assessment  - Modify environment to reduce risk of injury  - Consider OT/PT consult to assist with strengthening/mobility  Outcome: Completed  Goal: Maintain or return to baseline ADL function  Description: INTERVENTIONS:  -  Assess patient's ability to carry out ADLs; assess patient's baseline for ADL function and identify physical deficits which impact ability to perform ADLs (bathing, care of mouth/teeth, toileting, grooming, dressing, etc )  - Assess/evaluate cause of self-care deficits   - Assess range of motion  - Assess patient's mobility; develop plan if impaired  - Assess patient's need for assistive devices and provide as appropriate  - Encourage maximum independence but intervene and supervise when necessary  - Involve family in performance of ADLs  - Assess for home care needs following discharge   - Consider OT consult to assist with ADL evaluation and planning for discharge  - Provide patient education as appropriate  Outcome: Completed  Goal: Maintain or return mobility status to optimal level  Description: INTERVENTIONS:  - Assess patient's baseline mobility status (ambulation, transfers, stairs, etc )    - Identify cognitive and physical deficits and behaviors that affect mobility  - Identify mobility aids required to assist with transfers and/or ambulation (gait belt, sit-to-stand, lift, walker, cane, etc )  - [place] fall precautions as indicated by assessment  - Record patient progress and toleration of activity level on Mobility SBAR; progress patient to next Phase/Stage  - Instruct patient to call for assistance with activity based on assessment  - Consider rehabilitation consult to assist with strengthening/weightbearing, etc   Outcome: Completed     Problem: DISCHARGE PLANNING  Goal: Discharge to home or other facility with appropriate resources  Description: INTERVENTIONS:  - Identify barriers to discharge w/patient and caregiver  - Arrange for needed discharge resources and transportation as appropriate  - Identify discharge learning needs (meds, wound care, etc )  - Arrange for interpretive services to assist at discharge as needed  - Refer to Case Management Department for coordinating discharge planning if the patient needs post-hospital services based on physician/advanced practitioner order or complex needs related to functional status, cognitive ability, or social support system  Outcome: Completed     Problem: Knowledge Deficit  Goal: Patient/family/caregiver demonstrates understanding of disease process, treatment plan, medications, and discharge instructions  Description: Complete learning assessment and assess knowledge base  Interventions:  - Provide teaching at level of understanding  - Provide teaching via preferred learning methods  Outcome: Completed     Problem: Prexisting or High Potential for Compromised Skin Integrity  Goal: Skin integrity is maintained or improved  Description: INTERVENTIONS:  - Identify patients at risk for skin breakdown  - Assess and monitor skin integrity  - Assess and monitor nutrition and hydration status  - Monitor labs   - Assess for incontinence   - Turn and reposition patient  - Assist with mobility/ambulation  - Relieve pressure over bony prominences  - Avoid friction and shearing  - Provide appropriate hygiene as needed including keeping skin clean and dry  - Evaluate need for skin moisturizer/barrier cream  - Collaborate with interdisciplinary team   - Patient/family teaching  - Consider wound care consult   Outcome: Completed     Problem: Potential for Falls  Goal: Patient will remain free of falls  Description: INTERVENTIONS:  - Assess patient frequently for physical needs  -  Identify cognitive and physical deficits and behaviors that affect risk of falls    -  [place] fall precautions as indicated by assessment   - Educate patient/family on patient safety including physical limitations  - Instruct patient to call for assistance with activity based on assessment  - Modify environment to reduce risk of injury  - Consider OT/PT consult to assist with strengthening/mobility  Outcome: Completed     Problem: Nutrition/Hydration-ADULT  Goal: Nutrient/Hydration intake appropriate for improving, restoring or maintaining nutritional needs  Description: Monitor and assess patient's nutrition/hydration status for malnutrition  Collaborate with interdisciplinary team and initiate plan and interventions as ordered  Monitor patient's weight and dietary intake as ordered or per policy  Utilize nutrition screening tool and intervene as necessary  Determine patient's food preferences and provide high-protein, high-caloric foods as appropriate       INTERVENTIONS:  - Monitor oral intake, urinary output, labs, and treatment plans  - Assess nutrition and hydration status and recommend course of action  - Evaluate amount of meals eaten  - Assist patient with eating if necessary   - Allow adequate time for meals  - Recommend/ encourage appropriate diets, oral nutritional supplements, and vitamin/mineral supplements  - Order, calculate, and assess calorie counts as needed  - Recommend, monitor, and adjust tube feedings and TPN/PPN based on assessed needs  - Assess need for intravenous fluids  - Provide specific nutrition/hydration education as appropriate  - Include patient/family/caregiver in decisions related to nutrition  Outcome: Completed

## 2020-08-25 NOTE — NURSING NOTE
Patient decided to leave AMA after speaking with physician from [facility] who tried to convince the patient to stay  AMA paperwork was signed by the patient and placed in the patient's chart  Jaime Doherty Resident with [facility] made aware via [employer]

## 2020-08-26 ENCOUNTER — TRANSITIONAL CARE MANAGEMENT (OUTPATIENT)
Dept: FAMILY MEDICINE CLINIC | Facility: CLINIC | Age: 70
End: 2020-08-26

## 2020-08-27 ENCOUNTER — OFFICE VISIT (OUTPATIENT)
Dept: SURGERY | Facility: CLINIC | Age: 70
End: 2020-08-27

## 2020-08-27 ENCOUNTER — TELEPHONE (OUTPATIENT)
Dept: SURGERY | Facility: CLINIC | Age: 70
End: 2020-08-27

## 2020-08-27 VITALS — HEART RATE: 91 BPM | TEMPERATURE: 98.7 F | HEIGHT: 64 IN | WEIGHT: 93.6 LBS | BODY MASS INDEX: 15.98 KG/M2

## 2020-08-27 DIAGNOSIS — K56.609 INTESTINAL OBSTRUCTION (HCC): Primary | ICD-10-CM

## 2020-08-27 PROCEDURE — 1160F RVW MEDS BY RX/DR IN RCRD: CPT | Performed by: SURGERY

## 2020-08-27 PROCEDURE — 1111F DSCHRG MED/CURRENT MED MERGE: CPT | Performed by: SURGERY

## 2020-08-27 PROCEDURE — 99024 POSTOP FOLLOW-UP VISIT: CPT | Performed by: SURGERY

## 2020-08-27 PROCEDURE — 3008F BODY MASS INDEX DOCD: CPT | Performed by: SURGERY

## 2020-08-27 NOTE — PROGRESS NOTES
Office Visit - General Surgery  Slime Acosta MRN: [id number]  Encounter: [id number]    Assessment and Plan    Problem List Items Addressed This Visit     None      s/p exlap SBR  - pt doing well, pain controlled  - f/u in clinic x1 week for path review and staple removal    Chief Complaint:  Slime Acosta is a 65 y o  female who presents for Post-op (p/o bowel obstruction)    Subjective  S/p exlap, SBR  Pt doing well  Recently left hospital AMA  shreyas PO diet having bowel function  No n/v, no f/c  No complaints of pain    Past Medical History  Past Medical History:   Diagnosis Date    Anxiety     Seasonal allergies        Past Surgical History  Past Surgical History:   Procedure Laterality Date    EXPLORATORY LAPAROTOMY W/ BOWEL RESECTION N/A 8/22/2020    Procedure: LAPAROTOMY EXPLORATORY, REDUCTION OF INTERNAL HERNIA;  Surgeon: Becca Carrera MD;  [facility];  Service: General    SMALL INTESTINE SURGERY  8/22/2020    Procedure: RESECTION SMALL BOWEL X2   SMALL BOWEL ANASTOMOSIS X2 ;  Surgeon: Becca Carrera MD;  [facility];  Service: General       Family History  Family History   Problem Relation Age of Onset    No Known Problems Mother        Social History  Social History     Socioeconomic History    Marital status: Single     Spouse name: None    Number of children: None    Years of education: None    Highest education level: None   Occupational History    None   Social Needs    Financial resource strain: None    Food insecurity     Worry: None     Inability: None    Transportation needs     Medical: None     Non-medical: None   Tobacco Use    Smoking status: Light Tobacco Smoker    Smokeless tobacco: Never Used   Substance and Sexual Activity    Alcohol use: Never     Frequency: Never    Drug use: No    Sexual activity: Never   Lifestyle    Physical activity     Days per week: None     Minutes per session: None    Stress: None   Relationships    Social connections     Talks on phone: None     Gets together: None     Attends Judaism service: None     Active member of club or organization: None     Attends meetings of clubs or organizations: None     Relationship status: None    Intimate partner violence     Fear of current or ex partner: None     Emotionally abused: None     Physically abused: None     Forced sexual activity: None   Other Topics Concern    None   Social History Narrative    None        Medications  Current Outpatient Medications on File Prior to Visit   Medication Sig Dispense Refill    ibuprofen (ADVIL,MOTRIN) 100 MG tablet Take 100 mg by mouth every 6 (six) hours as needed for mild pain      mupirocin (BACTROBAN) 2 % ointment Apply topically 2 (two) times a day (Patient not taking: Reported on 7/14/2020) 22 g 1     No current facility-administered medications on file prior to visit  Allergies  Allergies   Allergen Reactions    Tetracyclines & Related Hives       Review of Systems   All other systems reviewed and are negative  Objective  Vitals:    08/27/20 1109   Pulse: 91   Temp: 98 7 °F (37 1 °C)       Physical Exam  Constitutional:       Appearance: Normal appearance  HENT:      Head: Atraumatic  Eyes:      Extraocular Movements: Extraocular movements intact  Neck:      Musculoskeletal: Neck supple  Cardiovascular:      Rate and Rhythm: Normal rate  Pulmonary:      Effort: Pulmonary effort is normal    Abdominal:      General: There is no distension  Palpations: Abdomen is soft  Tenderness: There is no abdominal tenderness  Comments: Incision C/D/I, staples intact   Skin:     General: Skin is warm and dry  Neurological:      General: No focal deficit present  Mental Status: She is alert and oriented to person, place, and time

## 2020-08-27 NOTE — TELEPHONE ENCOUNTER
Called patient to let her know that her appointment is for next week 9-4-20 @ 10:30 instead of tomorrow

## 2020-08-28 ENCOUNTER — TELEPHONE (OUTPATIENT)
Dept: SURGERY | Facility: CLINIC | Age: 70
End: 2020-08-28

## 2020-08-29 ENCOUNTER — TELEPHONE (OUTPATIENT)
Dept: OTHER | Facility: OTHER | Age: 70
End: 2020-08-29

## 2020-08-29 ENCOUNTER — TELEPHONE (OUTPATIENT)
Dept: SURGERY | Facility: CLINIC | Age: 70
End: 2020-08-29

## 2020-08-29 NOTE — TELEPHONE ENCOUNTER
Pt  Spoke to Dr Maryann Turk earlier, but she is very worried about the way her stomach feels around her incision and does not want to go to the ER  She says it is very warm to the touch  She is very apologetic and just needs clarity

## 2020-08-29 NOTE — TELEPHONE ENCOUNTER
Message as follow: [phone]/ Natasha Holliday Patient states when she wipes it's a good amount of blood on the tissue

## 2020-08-30 NOTE — TELEPHONE ENCOUNTER
General Surgery    Notified by Shama Stapleton at 10:09 AM regarding patient concerns  Chart reviewed prior to call -- Pt recently admitted at [facility] on 08/22 with closed loop obstruction, s/p ex-lap, reduction of internal hernia and SBR x2 by Dr Wali Angulo  Pt left AMA on 08/25  She was seen in the office by Dr Leslie Sandhoff on 08/27 - incisions looked good at that time and she was having bowel function  Pt reported that she was concerned regarding bright red blood noted on the toilet paper when wiping  She states she has never had this before and that her bowel movements are soft and normal in color (and denies melanotic stools)  She is taking a stool softener  She denies blood in the toilet bowl, only on the toilet paper  I discussed that this is likely 2/2 hemorrhoids based on her history, and recommended sitz baths (or a dio bottle) and fiber supplementation  She stated she did not feel comfortable doing either since the doctor told her not to  I reassured her several times that I was a surgeon and it would be to do so, but she refused  As a result, I advised her on sx to monitor for that would be concerning, including increasing blood per rectum, lightheadedness/dizziness, blood in the stool (in the toilet), etc      She then voiced concerns regarding her abdomen feeling warm  I asked her to describe her incision, but she said she did not want to take off the dressing over her incision and that the warmth was on the periphery of the dressing  There was associated erythema that she identified  She denies fevers/chills and stated she did not have any recent increasing tenderness to her abdomen  I discussed that I could not definitively ascertain the cause of her "warmth" without seeing her incision, but that Dr Leslie Sandhoff had not appreciated any signs/sx of wound infection 2 days ago   I advised her to monitor for signs of infection including increasing abdominal pain, N/V, fevers, chills, etc     I spent >15 minutes on the phone with the patient, alleviating her concerns  I also recommended she call the office on Monday to schedule an earlier appointment than 9/4 so we could re-evaluate these concerns  I received another notification from [address]juanita Logan [address] at 3:17 PM  In speaking to the patient, she stated she didn't feel any different from when I had spoken to her earlier, but wanted to know if there was anything else she should do to make the "warmth" go away  She asked if she should just stop touching her abdomen  I reiterated my recommendations from our earlier conversation, and recommended she come to [facility] ED for evaluation given her continued concerns  However, she stated several times she did not want to do so  As such, I again recommended she call the office on Monday  I spent an additional >10 minutes on the phone with the patient      Chad Garrido MD

## 2020-09-02 LAB
ATRIAL RATE: 71 BPM
P AXIS: 71 DEGREES
PR INTERVAL: 128 MS
QRS AXIS: 87 DEGREES
QRSD INTERVAL: 114 MS
QT INTERVAL: 424 MS
QTC INTERVAL: 460 MS
T WAVE AXIS: 61 DEGREES
VENTRICULAR RATE: 71 BPM

## 2020-09-02 PROCEDURE — 93010 ELECTROCARDIOGRAM REPORT: CPT | Performed by: INTERNAL MEDICINE

## 2020-09-04 ENCOUNTER — OFFICE VISIT (OUTPATIENT)
Dept: SURGERY | Facility: CLINIC | Age: 70
End: 2020-09-04

## 2020-09-04 VITALS
HEIGHT: 64 IN | DIASTOLIC BLOOD PRESSURE: 76 MMHG | WEIGHT: 90.2 LBS | HEART RATE: 92 BPM | TEMPERATURE: 97.6 F | BODY MASS INDEX: 15.4 KG/M2 | SYSTOLIC BLOOD PRESSURE: 118 MMHG

## 2020-09-04 DIAGNOSIS — K56.609 INTESTINAL OBSTRUCTION (HCC): Primary | ICD-10-CM

## 2020-09-04 PROCEDURE — 99024 POSTOP FOLLOW-UP VISIT: CPT | Performed by: SURGERY

## 2020-09-04 NOTE — ASSESSMENT & PLAN NOTE
Impression  Status post small bowel resection secondary to bowel obstruction    Recommendation  Diet as tolerated   Slowly increase activity  May left 10-15 lb    Resume all activity after 6 weeks since initial date of surgery  Staples remove

## 2020-09-04 NOTE — PROGRESS NOTES
Patient seen and examined  Status post small bowel and mesenteric mass resection secondary to small-bowel obstruction  Overall tolerating diet  Normal bowel movements  Pain improving  Abdomen soft nontender nondistended  No evidence of hernia  Midline incision was clean dry and intact  Salma were intact  There was minimal erythema on the inferior portion of the incision and appeared to be reactionary to the staples    Impression  Status post small bowel resection secondary to bowel obstruction    Recommendation  Diet as tolerated   Slowly increase activity  May left 10-15 lb    Resume all activity after 6 weeks since initial date of surgery  Staples removed

## 2020-09-04 NOTE — PATIENT INSTRUCTIONS
Diet as tolerated   Slowly increase activity  May left 10-15 lb    Resume all activity after 6 weeks since initial date of surgery  Staples removed  Follow-up as needed  May shower daily

## 2020-09-06 ENCOUNTER — TELEPHONE (OUTPATIENT)
Dept: OTHER | Facility: OTHER | Age: 70
End: 2020-09-06

## 2020-09-06 NOTE — TELEPHONE ENCOUNTER
PT got her staples removed Friday 9/4/20 and has some discharge  from where the staples were removed     Wanted to see if someone can take a look at it or should she be concerned

## 2020-09-08 ENCOUNTER — TELEPHONE (OUTPATIENT)
Dept: SURGERY | Facility: CLINIC | Age: 70
End: 2020-09-08

## 2020-09-08 NOTE — TELEPHONE ENCOUNTER
Returned phone call to patient regarding the drainage from her incision  Inquired how much drainage and asked if it was soaking a gauze in less than an hour, she stated she did not have the incision covered with a gauze she just had the strips that were put on on Friday  The drainage was just a little but she did not want that to get that on her underwear and wanted to have more put on when these fell off  Also she does not think she will be ready to go back to work on 10-5-2020/  She insisted that she would need more steri-strips put on after these fell off  I offered her another appointment and she just continued to ruminate about the steri-strips  She then said she would call back tomorrow and talk to [place]  She then hung up the phone

## 2020-09-16 ENCOUNTER — OFFICE VISIT (OUTPATIENT)
Dept: SURGERY | Facility: CLINIC | Age: 70
End: 2020-09-16

## 2020-09-16 VITALS — WEIGHT: 89.6 LBS | BODY MASS INDEX: 15.3 KG/M2 | HEIGHT: 64 IN | TEMPERATURE: 96.9 F

## 2020-09-16 DIAGNOSIS — Z90.49 STATUS POST SMALL BOWEL RESECTION: Primary | ICD-10-CM

## 2020-09-16 PROBLEM — K56.609 INTESTINAL OBSTRUCTION (HCC): Status: RESOLVED | Noted: 2020-08-22 | Resolved: 2020-09-16

## 2020-09-16 PROCEDURE — 99024 POSTOP FOLLOW-UP VISIT: CPT | Performed by: SURGERY

## 2020-09-16 NOTE — ASSESSMENT & PLAN NOTE
Doing well from a surgical standpoint  Last remaining suture was removed  Tried to reassure her that everything is fine  She is moving her bowels daily  She asked that was okay to use MiraLax or generic and I told her it was but she really did needed since she is moving her bowels every day  Call us if any further questions or problems  She will be going back to work in October

## 2020-09-16 NOTE — PROGRESS NOTES
Office Visit - General Surgery  Milagro Gordon MRN: [id number]  Encounter: [id number]    Assessment and Plan    Problem List Items Addressed This Visit        Other    Status post small bowel resection - Primary     Doing well from a surgical standpoint  Last remaining suture was removed  Tried to reassure her that everything is fine  She is moving her bowels daily  She asked that was okay to use MiraLax or generic and I told her it was but she really did needed since she is moving her bowels every day  Call us if any further questions or problems  She will be going back to work in October  Chief Complaint:  Milagro Gordon is a 65 y o  female who presents for Wound Check (abdominal wound check)    Subjective  77-year-old female status post exploratory laparotomy with small-bowel resection for bowel obstruction  She is doing fairly well  Noted 1 staple left in the wound  Otherwise no other complaints or problems  Past Medical History  Past Medical History:   Diagnosis Date    Anxiety     Seasonal allergies        Past Surgical History  Past Surgical History:   Procedure Laterality Date    EXPLORATORY LAPAROTOMY W/ BOWEL RESECTION N/A 8/22/2020    Procedure: LAPAROTOMY EXPLORATORY, REDUCTION OF INTERNAL HERNIA;  Surgeon: Nanette Rubio MD;  [facility];  Service: General    SMALL INTESTINE SURGERY  8/22/2020    Procedure: RESECTION SMALL BOWEL X2   SMALL BOWEL ANASTOMOSIS X2 ;  Surgeon: Nanette Rubio MD;  [facility];  Service: General       Family History  Family History   Problem Relation Age of Onset    No Known Problems Mother        Social History  Social History     Socioeconomic History    Marital status: Single     Spouse name: None    Number of children: None    Years of education: None    Highest education level: None   Occupational History    None   Social Needs    Financial resource strain: None    Food insecurity     Worry: None Inability: None    Transportation needs     Medical: None     Non-medical: None   Tobacco Use    Smoking status: Light Tobacco Smoker    Smokeless tobacco: Never Used   Substance and Sexual Activity    Alcohol use: Never     Frequency: Never    Drug use: No    Sexual activity: Never   Lifestyle    Physical activity     Days per week: None     Minutes per session: None    Stress: None   Relationships    Social connections     Talks on phone: None     Gets together: None     Attends Sikh service: None     Active member of club or organization: None     Attends meetings of clubs or organizations: None     Relationship status: None    Intimate partner violence     Fear of current or ex partner: None     Emotionally abused: None     Physically abused: None     Forced sexual activity: None   Other Topics Concern    None   Social History Narrative    None        Medications  Current Outpatient Medications on File Prior to Visit   Medication Sig Dispense Refill    ibuprofen (ADVIL,MOTRIN) 100 MG tablet Take 100 mg by mouth every 6 (six) hours as needed for mild pain      mupirocin (BACTROBAN) 2 % ointment Apply topically 2 (two) times a day (Patient not taking: Reported on 7/14/2020) 22 g 1     No current facility-administered medications on file prior to visit  Allergies  Allergies   Allergen Reactions    Tetracyclines & Related Hives       Review of Systems    Objective  Vitals:    09/16/20 1448   Temp: (!) 96 9 °F (36 1 °C)       Physical Exam   Abdomen:  Lower midline incision healing well    Remaining abdomen soft nontender

## 2020-09-28 ENCOUNTER — TELEPHONE (OUTPATIENT)
Dept: OTHER | Facility: OTHER | Age: 70
End: 2020-09-28

## 2020-09-28 NOTE — TELEPHONE ENCOUNTER
Patient called to cancel appointment schedule today 9/28/2020 Time: 8:45 AM GEN [address] [place] [address] due to her car wouldn't stop this morning   She wants office to call her back to see if there is available appointment today later in the day or tomorrow

## 2020-10-02 ENCOUNTER — OFFICE VISIT (OUTPATIENT)
Dept: SURGERY | Facility: CLINIC | Age: 70
End: 2020-10-02

## 2020-10-02 VITALS — HEIGHT: 64 IN | BODY MASS INDEX: 15.54 KG/M2 | TEMPERATURE: 97.4 F | WEIGHT: 91 LBS

## 2020-10-02 DIAGNOSIS — Z90.49 STATUS POST SMALL BOWEL RESECTION: Primary | ICD-10-CM

## 2020-10-02 PROCEDURE — 99024 POSTOP FOLLOW-UP VISIT: CPT | Performed by: SURGERY

## 2020-10-27 ENCOUNTER — TELEPHONE (OUTPATIENT)
Dept: SURGERY | Facility: CLINIC | Age: 70
End: 2020-10-27

## 2020-11-17 ENCOUNTER — TELEPHONE (OUTPATIENT)
Dept: SURGERY | Facility: CLINIC | Age: 70
End: 2020-11-17

## 2020-12-01 ENCOUNTER — OFFICE VISIT (OUTPATIENT)
Dept: SURGERY | Facility: CLINIC | Age: 70
End: 2020-12-01

## 2020-12-01 VITALS — TEMPERATURE: 96.7 F | WEIGHT: 92.8 LBS | BODY MASS INDEX: 15.84 KG/M2 | HEIGHT: 64 IN

## 2020-12-01 DIAGNOSIS — Z90.49 STATUS POST SMALL BOWEL RESECTION: Primary | ICD-10-CM

## 2020-12-01 PROCEDURE — 99024 POSTOP FOLLOW-UP VISIT: CPT | Performed by: SURGERY

## 2021-01-11 ENCOUNTER — TELEPHONE (OUTPATIENT)
Dept: SURGERY | Facility: CLINIC | Age: 71
End: 2021-01-11

## 2021-01-11 NOTE — TELEPHONE ENCOUNTER
Patient called and wanted to know how long the bubbles at her incision will go away  She was referred to her PCP  Patient refuses  She just wanted me to ask how long until they go away

## 2021-01-14 ENCOUNTER — TELEPHONE (OUTPATIENT)
Dept: SURGERY | Facility: CLINIC | Age: 71
End: 2021-01-14

## 2021-06-24 ENCOUNTER — OFFICE VISIT (OUTPATIENT)
Dept: URGENT CARE | Age: 71
End: 2021-06-24

## 2021-06-24 VITALS
DIASTOLIC BLOOD PRESSURE: 54 MMHG | SYSTOLIC BLOOD PRESSURE: 100 MMHG | TEMPERATURE: 97.3 F | HEART RATE: 74 BPM | OXYGEN SATURATION: 97 %

## 2021-06-24 DIAGNOSIS — D18.01 CHERRY ANGIOMA: Primary | ICD-10-CM

## 2021-06-24 PROCEDURE — 99213 OFFICE O/P EST LOW 20 MIN: CPT | Performed by: PHYSICIAN ASSISTANT

## 2021-06-24 NOTE — PROGRESS NOTES
[address] [facility]        NAME: Kathy Child is a 66 y o  female  : 1950    MRN: [id number]  DATE: 2021  TIME: 6:57 PM    Assessment and Plan   Cherry angioma [D18 01]  1  Cherry angioma       Significant amount of time was spent on patient education on cherry angioma   She is educated that she may have always had these small cherry angiomas, but is now noticing it due to close inspection  She is educated continue monitoring them  Follow-up with primary care physician any other concerns  Patient remains anxious, but grateful for her care today  She is agreeable to treatment plan  Patient Instructions      Physical exam today benign   follow-upwith surgeon in regards to concerns over incision  And sutures  Small re d dots to the th torso consistent with :Cherry angiomas,  Cherry hemangioma  Follow up with PCP in 3-5 days  Proceed to  ER if symptoms worsen  Chief Complaint     Chief Complaint   Patient presents with    Rash     Patient has red spots around her chest          History of Present Illness        70-year-old female presents the office for chief complaint of red dots on her torso that she initially noticed 5 days ago  She denies any pain or itching of the area  She only noticed the red dots as  She was looking at the area with a magnifying glass  Patient had small bowel obstruction with subsequent small-bowel resection in 2020  She has surgical scar which she was looking at with a magnifying glass when she 1st noticed small red dots on her torso  Both on upper abdomen as well as bilateral sides  She also has noticed few spots on the back similar  Patient was extremely anxious about these dots in thus wanted to have an evaluation  Patient is worried that these dots are something to be worried about  She denies any other ill symptoms  She denies any fever, chills shortness of breath, chest pain  She that she has been feeling well    Patient has upcoming appointment on [id number] with surgeon who performed her small-bowel resection for concerns about bumps underneath her surgical scar  She denies any pain to the area  Review of Systems   Review of Systems   Constitutional: Negative for chills and fever  Cardiovascular: Negative  Gastrointestinal: Negative  Genitourinary: Negative  Skin: Positive for rash  Neurological: Negative  Current Medications       Current Outpatient Medications:     ibuprofen (ADVIL,MOTRIN) 100 MG tablet, Take 100 mg by mouth every 6 (six) hours as needed for mild pain, Disp: , Rfl:     mupirocin (BACTROBAN) 2 % ointment, Apply topically 2 (two) times a day (Patient not taking: Reported on 7/14/2020), Disp: 22 g, Rfl: 1    Current Allergies     Allergies as of 06/24/2021 - Reviewed 06/24/2021   Allergen Reaction Noted    Tetracyclines & related Hives 01/27/2018            The following portions of the patient's history were reviewed and updated as appropriate: allergies, current medications, past family history, past medical history, past social history, past surgical history and problem list      Past Medical History:   Diagnosis Date    Anxiety     Seasonal allergies        Past Surgical History:   Procedure Laterality Date    EXPLORATORY LAPAROTOMY W/ BOWEL RESECTION N/A 8/22/2020    Procedure: LAPAROTOMY EXPLORATORY, REDUCTION OF INTERNAL HERNIA;  Surgeon: Hannah Hernandez MD;  [facility];  Service: General    SMALL INTESTINE SURGERY  8/22/2020    Procedure: RESECTION SMALL BOWEL X2  SMALL BOWEL ANASTOMOSIS X2 ;  Surgeon: Hannah Hernandez MD;  [facility];  Service: General       Family History   Problem Relation Age of Onset    No Known Problems Mother          Medications have been verified  Objective   /54   Pulse 74   Temp (!) 97 3 °F (36 3 °C) (Temporal)   SpO2 97%   No LMP recorded   Patient is postmenopausal        Physical Exam     Physical Exam  Constitutional:       General: She is not in acute distress  Appearance: Normal appearance  She is well-developed  She is not diaphoretic  HENT:      Head: Normocephalic and atraumatic  Right Ear: External ear normal       Left Ear: External ear normal       Nose: No mucosal edema or rhinorrhea  Right Sinus: No maxillary sinus tenderness or frontal sinus tenderness  Left Sinus: No maxillary sinus tenderness or frontal sinus tenderness  Mouth/Throat:      Dentition: No dental caries  Pharynx: Uvula midline  No oropharyngeal exudate, posterior oropharyngeal erythema or uvula swelling  Tonsils: No tonsillar exudate or tonsillar abscesses  Eyes:      General: Lids are normal  No scleral icterus  Right eye: No discharge  Left eye: No discharge  Conjunctiva/sclera: Conjunctivae normal       Pupils: Pupils are equal, round, and reactive to light  Cardiovascular:      Rate and Rhythm: Normal rate and regular rhythm  Heart sounds: Normal heart sounds, S1 normal and S2 normal  No murmur heard  No S3 or S4 sounds  Pulmonary:      Effort: Pulmonary effort is normal  No respiratory distress  Breath sounds: Normal breath sounds  No stridor  No wheezing, rhonchi or rales  Abdominal:      General: Bowel sounds are normal  There is no distension  Palpations: Abdomen is soft  Abdomen is not rigid  Tenderness: There is no abdominal tenderness  There is no guarding  Lymphadenopathy:      Cervical: No cervical adenopathy  Skin:     General: Skin is warm and dry  Coloration: Skin is not pale  Findings: No rash  Neurological:      Mental Status: She is alert  Psychiatric:         Mood and Affect: Mood is anxious

## 2021-06-24 NOTE — PATIENT INSTRUCTIONS
Physical exam today benign   follow-upwith surgeon in regards to concerns over incision  And sutures  Small re d dots to the th torso consistent with :Cherry angiomas,  Cherry hemangioma    Cherry angiomas, also known as [employer] spots or senile angiomas, are cherry red papules on the skin  They are a harmless benign tumour, containing an abnormal proliferation of blood vessels, and have no relationship to cancer  They are the most common kind of angioma, and increase with age, occurring in nearly all adults over 30 years

## 2021-07-05 DIAGNOSIS — N28.1 RENAL CYST, RIGHT: Primary | ICD-10-CM

## 2021-07-13 ENCOUNTER — TELEPHONE (OUTPATIENT)
Dept: OTHER | Facility: OTHER | Age: 71
End: 2021-07-13

## 2021-07-13 ENCOUNTER — OFFICE VISIT (OUTPATIENT)
Dept: SURGERY | Facility: CLINIC | Age: 71
End: 2021-07-13

## 2021-07-13 VITALS — HEIGHT: 64 IN | TEMPERATURE: 97.7 F | BODY MASS INDEX: 15.95 KG/M2 | WEIGHT: 93.4 LBS

## 2021-07-13 DIAGNOSIS — Z90.49 STATUS POST SMALL BOWEL RESECTION: Primary | ICD-10-CM

## 2021-07-13 PROCEDURE — 99024 POSTOP FOLLOW-UP VISIT: CPT | Performed by: SURGERY

## 2021-07-13 NOTE — TELEPHONE ENCOUNTER
I spoke to patient this morning and she was calling in regards to the letter she received reminding her to follow up with the kidney and bladder ultrasound, as recommended by the radiologist from the CT she had done last fall  She did not follow up with our office after the hospitalization, and I explained that usually we review all of those details at the visit, but she had refused a TCM  She said she has no concerns about her urination, but I explained the ultrasound would allow them to better visualize the cyst  She is very nervous and concerned about the results worried that it is cancer  I tried to help ease her mind as they would have been able to see that as opposed to a cyst and we would have addressed it immediately  She did schedule a visit for 7/29/21 but that is to discuss another issue she has  She states that she cannot afford another test or medical bill, I advised her to check with her insurance regarding coverage  She did explain that she is unsure of staying with Dr Marisela Amos as PCP or finding another one  Bethany Host would like a call back tomorrow, 7/14/21, in the morning prior to 10 am at [phone] 
Pt would like a call back regarding a letter received by Dr Jorge Amador  Pt does not understand the letter and would like a call back as soon as possible 
no

## 2021-07-13 NOTE — ASSESSMENT & PLAN NOTE
There is a suture poking through which was removed without difficulty  She has a few other sutures which I told her it should be a problem and if she really wants to have them  Remove, she needs son make another appointment and we can excise them  I tried to reassure that there were only a few petechiae and there nothing worrisome or nothing to do anything about  She showed me a CT report suggesting an ultrasound and a request for an ultrasound  I told her she should do that to find out exactly what is going on with her kidney

## 2021-07-13 NOTE — PROGRESS NOTES
Office Visit - General Surgery  Juan Carlos Ruiz MRN: [id number]  Encounter: [id number]    Assessment and Plan    Problem List Items Addressed This Visit        Other    Status post small bowel resection - Primary       There is a suture poking through which was removed without difficulty  She has a few other sutures which I told her it should be a problem and if she really wants to have them  Remove, she needs son make another appointment and we can excise them  I tried to reassure that there were only a few petechiae and there nothing worrisome or nothing to do anything about  She showed me a CT report suggesting an ultrasound and a request for an ultrasound  I told her she should do that to find out exactly what is going on with her kidney  Chief Complaint:  Juan Carlos Ruiz is a 66 y o  female who presents for Follow-up (f/u possible retained suture from 8/20 surgery )    Subjective    42-year-old female status post laparotomy with bowel resection almost a year ago  She has called the office on a couple of occasions with questions  One is she has bumps along her incision that bother her  Another 1 is that she has red spots on her right side of her abdomen  She recently got a report saying she needs an ultrasound of her kidney for cystic lesion seen on previous CT scanning    Past Medical History  Past Medical History:   Diagnosis Date    Anxiety     Seasonal allergies        Past Surgical History  Past Surgical History:   Procedure Laterality Date    EXPLORATORY LAPAROTOMY W/ BOWEL RESECTION N/A 8/22/2020    Procedure: LAPAROTOMY EXPLORATORY, REDUCTION OF INTERNAL HERNIA;  Surgeon: Celine Fink MD;  [facility];  Service: General    SMALL INTESTINE SURGERY  8/22/2020    Procedure: RESECTION SMALL BOWEL X2   SMALL BOWEL ANASTOMOSIS X2 ;  Surgeon: Celine Fink MD;  [facility];  Service: General       Family History  Family History   Problem Relation Age of Onset    No Known Problems Mother        Social History  Social History     Socioeconomic History    Marital status: Single     Spouse name: None    Number of children: None    Years of education: None    Highest education level: None   Occupational History    None   Tobacco Use    Smoking status: Light Tobacco Smoker    Smokeless tobacco: Never Used   Substance and Sexual Activity    Alcohol use: Never    Drug use: No    Sexual activity: Never   Other Topics Concern    None   Social History Narrative    None     Social Determinants of Health     Financial Resource Strain:     Difficulty of Paying Living Expenses:    Food Insecurity:     Worried About Running Out of Food in the Last Year:     Ran Out of Food in the Last Year:    Transportation Needs:     Lack of Transportation (Medical):  Lack of Transportation (Non-Medical):    Physical Activity:     Days of Exercise per Week:     Minutes of Exercise per Session:    Stress:     Feeling of Stress :    Social Connections:     Frequency of Communication with Friends and Family:     Frequency of Social Gatherings with Friends and Family:     Attends Sikhism Services:     Active Member of Clubs or Organizations:     Attends Club or Organization Meetings:     Marital Status:    Intimate Partner Violence:     Fear of Current or Ex-Partner:     Emotionally Abused:     Physically Abused:     Sexually Abused:         Medications  Current Outpatient Medications on File Prior to Visit   Medication Sig Dispense Refill    ibuprofen (ADVIL,MOTRIN) 100 MG tablet Take 100 mg by mouth every 6 (six) hours as needed for mild pain      mupirocin (BACTROBAN) 2 % ointment Apply topically 2 (two) times a day (Patient not taking: Reported on 7/14/2020) 22 g 1     No current facility-administered medications on file prior to visit         Allergies  Allergies   Allergen Reactions    Tetracyclines & Related Hives       Review of Systems    Objective  Vitals:    07/13/21 1154   Temp: 97 7 °F (36 5 °C)       Physical Exam    abdomen:  Midline incision healing well  There was a few suture palpable underneath the skin  In lower end of the incision there was 1 tail of suture visible     She has maybe 1 or 2 petechiae that I can see on the right side of her abdomen and flank    Procedures   after permission, the area where there was an exposed suture was prepped with alcohol  Local anesthesia 1% lidocaine with epi was infiltrated  Total of 4 mL was used  The suture was grasped pulled up cut and then removed  Band-Aid dressing applied  Tolerated procedure well

## 2021-07-15 ENCOUNTER — TELEPHONE (OUTPATIENT)
Dept: SURGERY | Facility: CLINIC | Age: 71
End: 2021-07-15

## 2021-07-15 NOTE — TELEPHONE ENCOUNTER
Patient called because she experienced pain yesterday around her umbilicus and wanted to know what the doctor injected her with during her suture removal visit  It was shown in the chart that lidocaine was used  Patient thinks she is having an allergic reaction and wants to be called to have reassurance that this is normal       I offered a recommendation of ER, urgent care or an appointment to be seen by doctor and patient refused all three  I explained that she cannot just walk in and have office staff make an evaluation  She requested that both Dr Demetrio Barrow and nurse Barb Hardin should call her back asap

## 2021-07-20 ENCOUNTER — TELEPHONE (OUTPATIENT)
Dept: OBGYN CLINIC | Facility: HOSPITAL | Age: 71
End: 2021-07-20

## 2021-07-20 ENCOUNTER — OFFICE VISIT (OUTPATIENT)
Dept: PODIATRY | Facility: CLINIC | Age: 71
End: 2021-07-20

## 2021-07-20 VITALS
HEIGHT: 64 IN | BODY MASS INDEX: 16.15 KG/M2 | SYSTOLIC BLOOD PRESSURE: 105 MMHG | WEIGHT: 94.6 LBS | HEART RATE: 67 BPM | DIASTOLIC BLOOD PRESSURE: 66 MMHG

## 2021-07-20 DIAGNOSIS — L84 CORNS: Primary | ICD-10-CM

## 2021-07-20 DIAGNOSIS — L60.3 NAIL DYSTROPHY: ICD-10-CM

## 2021-07-20 PROCEDURE — NCFTCARE PR NON-COVERED FOOT CARE: Performed by: PODIATRIST

## 2021-07-20 NOTE — PROGRESS NOTES
Patient presents for palliative foot care  No acute disorder noted  Treatment consisted of nail and lesion trimming  Pedal pulses are within normal limits

## 2021-07-27 ENCOUNTER — TELEPHONE (OUTPATIENT)
Dept: PODIATRY | Facility: CLINIC | Age: 71
End: 2021-07-27

## 2021-07-27 NOTE — TELEPHONE ENCOUNTER
Rory Garcia called, she is very upset that she got a bill for $10 as she is [facility] and only has to pay $30   I explained that it was sent in as routine foot care    I called billing and spoke with Mary Kate Estrada and she adjusted the $10

## 2021-07-29 ENCOUNTER — OFFICE VISIT (OUTPATIENT)
Dept: FAMILY MEDICINE CLINIC | Facility: CLINIC | Age: 71
End: 2021-07-29

## 2021-07-29 VITALS
HEART RATE: 76 BPM | WEIGHT: 92.6 LBS | HEIGHT: 64 IN | RESPIRATION RATE: 16 BRPM | TEMPERATURE: 98.8 F | DIASTOLIC BLOOD PRESSURE: 72 MMHG | BODY MASS INDEX: 15.81 KG/M2 | SYSTOLIC BLOOD PRESSURE: 110 MMHG

## 2021-07-29 DIAGNOSIS — F41.9 ANXIETY: ICD-10-CM

## 2021-07-29 DIAGNOSIS — Z72.0 TOBACCO USE: ICD-10-CM

## 2021-07-29 DIAGNOSIS — D18.01 CHERRY ANGIOMA: Primary | ICD-10-CM

## 2021-07-29 DIAGNOSIS — N28.1 CYST OF RIGHT KIDNEY: ICD-10-CM

## 2021-07-29 PROBLEM — R19.00 ABDOMINAL WALL BULGE: Status: RESOLVED | Noted: 2020-06-16 | Resolved: 2021-07-29

## 2021-07-29 PROCEDURE — 99213 OFFICE O/P EST LOW 20 MIN: CPT | Performed by: FAMILY MEDICINE

## 2021-07-29 NOTE — PROGRESS NOTES
Chief Complaint   Patient presents with   [address]   Topic Date Due    Hepatitis C Screening  Never done    Pneumococcal Vaccine: 65+ Years (1 of 2 - PPSV23) Never done    Annual Physical  Never done    DTaP,Tdap,and Td Vaccines (1 - Tdap) Never done    Breast Cancer Screening: Mammogram  Never done    Colorectal Cancer Screening  Never done    BMI: Followup Plan  06/16/2021    Influenza Vaccine (1) 09/01/2021    Fall Risk  07/29/2022    Depression Screening PHQ  07/29/2022    BMI: Adult  07/29/2022    COVID-19 Vaccine  Completed    HIB Vaccine  Aged Out    Hepatitis B Vaccine  Aged Out    IPV Vaccine  Aged Out    Hepatitis A Vaccine  Aged Out    Meningococcal ACWY Vaccine  Aged Out    HPV Vaccine  Aged Out       Tobacco Cessation Counseling: Tobacco cessation counseling was provided  The patient is sincerely urged to quit consumption of tobacco  She is not ready to quit tobacco     Assessment/Plan:    Cherry angioma  Patient has small cherry angiomas on abdomen, upper chest and back  Reassured patient that those skin lesions are benign and do not require excisions  Cyst of right kidney  CT of the abdomen and pelvis done in August 2020 showed right renal cyst with thin enhancing septation  Radiologist recommended nonemergent ultrasound follow-up  Explained to patient that she would need to schedule renal U/S to for furher evaluation of right renal cyst and our office will call her with results  Patient is very anxious, due to financial difficulties she would like to postpone scheduling testing till September -October  Anxiety  Patient states that she feels better after explanation to her that cherry angiomas are benign skin lesions and understands results of CT scan done last August   Provided reassurance to patient  Recommended to practice relaxation techniques  Tobacco use  Discussed smoking cessation with patient      Encouraged to quit smoking  Diagnoses and all orders for this visit:    Karolina Woods angioma    Anxiety    Cyst of right kidney    Tobacco use    Other orders  -     Cancel: Mammo screening bilateral w cad; Future  -     Cancel: Hepatitis C Antibody ([facility], [facility]); Future  -     Cancel: PNEUMOCOCCAL POLYSACCHARIDE VACCINE 23-VALENT =>3YO SQ IM  -     Cancel: TDAP VACCINE GREATER THAN OR EQUAL TO 8YO IM  -     Cancel: Cologuard; Future          Subjective:      Patient ID: Elzbieta Cui is a 66 y o  female  HPI     Patient is a 15-year-old female with history severe anxiety presents today to discuss red spots on her abdomen, chest and review result of her CT abdomen and pelvis which she had in August 2020  Patient went to urgent care center last month and was  told that she has cherry angiomas  She is very concerned that those skin lesions may be  serious problem  Reviewed with patient CT abdomen and pelvis report from August 2020 which showed small-bowel obstruction  Patient underwent surgery on 8/22/2020  CT also showed a small right renal cyst with thin enhancing septation  Radiologist recommended nonemergent ultrasound follow-up  Our office received a letter from Radiology Department in regards to follow-up on abnormal test results from last August and patient was contacted to schedule renal U/S  Patient is very anxious that nobody told her in the hospital about renal cyst     She is worried that this lesion can be cancerous  She denies abdominal pain, nausea, vomiting, diarrhea  No urinary symptoms  Patient has financial difficulties and does not want any additional testing because needs to pay out of pocket  Patient was seen by general surgeon Dr Deedee Traylor on 7/13/21 for possible retained suture from small-bowel resection surgery in August 2020  Retained suture was removed without difficulty  Patient continues to smoke 4-5 cigarettes daily      She tries to stop smoking  Completed COVID vaccination in February 2021  The following portions of the patient's history were reviewed and updated as appropriate: allergies, past family history, past medical history, past social history, past surgical history and problem list     Review of Systems   Constitutional: Negative for activity change, appetite change, chills, fatigue and fever  HENT: Negative for congestion  Respiratory: Negative for cough, chest tightness, shortness of breath and wheezing  Cardiovascular: Negative for chest pain, palpitations and leg swelling  Gastrointestinal: Negative for abdominal pain, blood in stool, constipation, diarrhea, nausea and vomiting  Genitourinary: Negative for difficulty urinating, dysuria, flank pain, frequency, hematuria and pelvic pain  Musculoskeletal: Negative for back pain  Skin:        Red spots on abdomen, chest, back   Neurological: Negative for dizziness and headaches  Hematological: Negative  Psychiatric/Behavioral: Negative for dysphoric mood  The patient is nervous/anxious  Objective:      /72   Pulse 76   Temp 98 8 °F (37 1 °C) (Tympanic)   Resp 16   Ht 5' 4" (1 626 m)   Wt 42 kg (92 lb 9 6 oz)   BMI 15 89 kg/m²          Physical Exam  Vitals and nursing note reviewed  Constitutional:       Appearance: Normal appearance  HENT:      Head: Normocephalic and atraumatic  Eyes:      Conjunctiva/sclera: Conjunctivae normal       Pupils: Pupils are equal, round, and reactive to light  Cardiovascular:      Rate and Rhythm: Normal rate and regular rhythm  Heart sounds: No murmur heard  Pulmonary:      Effort: Pulmonary effort is normal       Breath sounds: Normal breath sounds  Abdominal:      General: Bowel sounds are normal  There is no distension  Palpations: Abdomen is soft  Tenderness: There is no abdominal tenderness  Musculoskeletal:         General: No swelling, tenderness or deformity   Normal range of motion  Cervical back: Normal range of motion and neck supple  Right lower leg: No edema  Left lower leg: No edema  Skin:     General: Skin is warm and dry  Findings: No rash  Comments: Midline surgical scar with no surrounding skin erythema  Small cherry angiomas on abdomen, upper chest, back  Small seborrheic keratosis lesions on back  Neurological:      Mental Status: She is alert     Psychiatric:      Comments: Patient is very anxious

## 2021-07-30 NOTE — ASSESSMENT & PLAN NOTE
CT of the abdomen and pelvis done in August 2020 showed right renal cyst with thin enhancing septation  Radiologist recommended nonemergent ultrasound follow-up  Explained to patient that she would need to schedule renal U/S to for furher evaluation of right renal cyst and our office will call her with results  Patient is very anxious, due to financial difficulties she would like to postpone scheduling testing till September -October

## 2021-07-30 NOTE — ASSESSMENT & PLAN NOTE
Patient states that she feels better after explanation to her that cherry angiomas are benign skin lesions and understands results of CT scan done last August   Provided reassurance to patient  Recommended to practice relaxation techniques

## 2021-07-30 NOTE — ASSESSMENT & PLAN NOTE
Patient has small cherry angiomas on abdomen, upper chest and back  Reassured patient that those skin lesions are benign and do not require excisions

## 2021-08-03 ENCOUNTER — TELEPHONE (OUTPATIENT)
Dept: PODIATRY | Facility: CLINIC | Age: 71
End: 2021-08-03

## 2021-08-03 NOTE — TELEPHONE ENCOUNTER
Althea Vo called as she still feels the corn on the left foot, middle toe  Do you have any suggestions  It is actually causing her to limp and it has been 2 weeks since her appointment

## 2021-08-04 NOTE — TELEPHONE ENCOUNTER
RIKA Carl 23 minutes ago (7:38 AM)     Schedule her    Message text      Attempted to call patient to schedule an appt and was unable to leave a message as there is no voicemail set up

## 2021-08-17 ENCOUNTER — OFFICE VISIT (OUTPATIENT)
Dept: SURGERY | Facility: CLINIC | Age: 71
End: 2021-08-17

## 2021-08-17 VITALS — HEIGHT: 64 IN | TEMPERATURE: 97.6 F | WEIGHT: 91.4 LBS | BODY MASS INDEX: 15.6 KG/M2

## 2021-08-17 DIAGNOSIS — Z90.49 STATUS POST SMALL BOWEL RESECTION: Primary | ICD-10-CM

## 2021-08-17 PROCEDURE — 99024 POSTOP FOLLOW-UP VISIT: CPT | Performed by: SURGERY

## 2021-08-17 NOTE — ASSESSMENT & PLAN NOTE
Overall I tried to reassure her that everything is fine  Another suture granuloma was removed  We will see her back here if needed

## 2021-08-17 NOTE — PROGRESS NOTES
Office Visit - General Surgery  David Tripp MRN: [id number]  Encounter: [id number]    Assessment and Plan    Problem List Items Addressed This Visit        Other    Status post small bowel resection - Primary       Overall I tried to reassure her that everything is fine  Another suture granuloma was removed  We will see her back here if needed  Chief Complaint:  David Tripp is a 66 y o  female who presents for Follow-up (f/u for suture removal )    Subjective   66-year-old female comes in for further evaluation her abdomen where she has sutures present from a previous laparotomy  Past Medical History  Past Medical History:   Diagnosis Date    Anxiety     Seasonal allergies        Past Surgical History  Past Surgical History:   Procedure Laterality Date    EXPLORATORY LAPAROTOMY W/ BOWEL RESECTION N/A 8/22/2020    Procedure: LAPAROTOMY EXPLORATORY, REDUCTION OF INTERNAL HERNIA;  Surgeon: Maksim Peoples MD;  [facility];  Service: General    SMALL INTESTINE SURGERY  8/22/2020    Procedure: RESECTION SMALL BOWEL X2   SMALL BOWEL ANASTOMOSIS X2 ;  Surgeon: Maksim Peoples MD;  [facility];  Service: General       Family History  Family History   Problem Relation Age of Onset    No Known Problems Mother        Social History  Social History     Socioeconomic History    Marital status: Single     Spouse name: None    Number of children: None    Years of education: None    Highest education level: None   Occupational History    None   Tobacco Use    Smoking status: Light Tobacco Smoker    Smokeless tobacco: Never Used   Substance and Sexual Activity    Alcohol use: Never    Drug use: No    Sexual activity: Never   Other Topics Concern    None   Social History Narrative    None     Social Determinants of Health     Financial Resource Strain:     Difficulty of Paying Living Expenses:    Food Insecurity:     Worried About Running Out of Food in the Last Year:     Ran Out of Food in the Last Year:    Transportation Needs:     Lack of Transportation (Medical):  Lack of Transportation (Non-Medical):    Physical Activity:     Days of Exercise per Week:     Minutes of Exercise per Session:    Stress:     Feeling of Stress :    Social Connections:     Frequency of Communication with Friends and Family:     Frequency of Social Gatherings with Friends and Family:     Attends Anglican Services:     Active Member of Clubs or Organizations:     Attends Club or Organization Meetings:     Marital Status:    Intimate Partner Violence:     Fear of Current or Ex-Partner:     Emotionally Abused:     Physically Abused:     Sexually Abused:         Medications  Current Outpatient Medications on File Prior to Visit   Medication Sig Dispense Refill    ibuprofen (ADVIL,MOTRIN) 100 MG tablet Take 100 mg by mouth every 6 (six) hours as needed for mild pain       No current facility-administered medications on file prior to visit  Allergies  Allergies   Allergen Reactions    Tetracyclines & Related Hives       Review of Systems    Objective  Vitals:    08/17/21 1124   Temp: 97 6 °F (36 4 °C)       Physical Exam     Abdomen: Midline incision well healed under few sutures noted subcutaneously, the remaining abdomen is soft nontender    Procedures   after permission, midline abdomen was prepped and draped in usual fashion  Time-out taken  Local anesthesia of 1% lidocaine with epi was infiltrated into the skin and subcutaneous tissue near the top end of the incision where a suture was palpable underneath the skin  An 11 blade knife was used small incision made through the old scar  Sharp and blunt dissection was carried out to lay suture was identified  It was lifted up 1 limb was cut and the suture was removed  A dressing was applied  Tolerated procedure well

## 2021-08-18 ENCOUNTER — TELEPHONE (OUTPATIENT)
Dept: FAMILY MEDICINE CLINIC | Facility: CLINIC | Age: 71
End: 2021-08-18

## 2021-08-18 NOTE — TELEPHONE ENCOUNTER
Patient states at her surgical post op appointment she was notified she had a hernia and has for some time and would like to know if we knew she had one and if so why she was not notified of this information  Subjective   Han Garcia is a 60 y.o. male.     History of Present Illness   Han Garcia 60 y.o. male who presents for evaluation of sinus pressure and congestion. Symptoms include ear pressure, congestion, sore throat, nasal blockage, productive cough, headache and fatigue.  Onset of symptoms was 1 month ago, unchanged since that time. Patient denies shortness of breath, wheezing, fever.   Evaluation to date: none Treatment to date:  none.   Reports dizziness when standing or bending over.  Reports it is spinning sensation but does not feel that he is going to pass out.         Reports low BP readings 90s/60s at home.   The following portions of the patient's history were reviewed and updated as appropriate: allergies, current medications, past family history, past medical history, past social history, past surgical history and problem list.    Review of Systems   Constitutional: Positive for appetite change, fatigue and unexpected weight change. Negative for chills and fever.   HENT: Positive for congestion, ear pain, postnasal drip, rhinorrhea, sinus pressure and sore throat. Negative for ear discharge.    Eyes: Negative for visual disturbance.   Respiratory: Positive for cough and shortness of breath. Negative for chest tightness.         Let chest pain with inspiration    Gastrointestinal: Negative for abdominal distention, abdominal pain, anal bleeding, blood in stool, constipation, diarrhea, nausea, rectal pain and vomiting.   Neurological: Positive for dizziness, light-headedness and headaches. Negative for numbness.       Objective   Physical Exam   Constitutional: He is oriented to person, place, and time. He appears well-developed and well-nourished.   HENT:   Right Ear: Tympanic membrane, external ear and ear canal normal.   Left Ear: Tympanic membrane, external ear and ear canal normal.   Nose: Mucosal edema and rhinorrhea present. Right sinus exhibits no maxillary sinus tenderness and no  frontal sinus tenderness. Left sinus exhibits no maxillary sinus tenderness and no frontal sinus tenderness.   Mouth/Throat: Uvula is midline and oropharynx is clear and moist.   Cardiovascular: Normal rate and regular rhythm.   Pulmonary/Chest: Effort normal. He has rales in the left lower field.   Neurological: He is oriented to person, place, and time.   Skin: Skin is warm and dry.   Psychiatric: He has a normal mood and affect. His behavior is normal. Judgment and thought content normal.   Nursing note and vitals reviewed.      Assessment/Plan   Han was seen today for fatigue, not eating and dizziness.    Diagnoses and all orders for this visit:    Acute sinusitis, recurrence not specified, unspecified location  -     amoxicillin-clavulanate (AUGMENTIN) 875-125 MG per tablet; Take 1 tablet by mouth 2 (Two) Times a Day for 10 days.    Pneumonia of left lower lobe due to infectious organism (CMS/[facility])  -     amoxicillin-clavulanate (AUGMENTIN) 875-125 MG per tablet; Take 1 tablet by mouth 2 (Two) Times a Day for 10 days.    Orthostatic hypotension             Stop amlodipine and f/u in 2 weeks.

## 2021-08-18 NOTE — TELEPHONE ENCOUNTER
Please call patient  Based on last office visit note from general surgeon Dr Christie Simms from yesterday, there is no mentioning about recurrent hernia

## 2021-08-19 ENCOUNTER — TELEPHONE (OUTPATIENT)
Dept: SURGERY | Facility: CLINIC | Age: 71
End: 2021-08-19

## 2021-08-21 ENCOUNTER — HOSPITAL ENCOUNTER (OUTPATIENT)
Dept: RADIOLOGY | Facility: HOSPITAL | Age: 71
Discharge: HOME | End: 2021-08-21

## 2021-08-21 DIAGNOSIS — N28.1 RENAL CYST, RIGHT: ICD-10-CM

## 2021-08-21 PROCEDURE — 76770 US EXAM ABDO BACK WALL COMP: CPT

## 2021-08-26 ENCOUNTER — TELEPHONE (OUTPATIENT)
Dept: FAMILY MEDICINE CLINIC | Facility: CLINIC | Age: 71
End: 2021-08-26

## 2021-08-26 NOTE — TELEPHONE ENCOUNTER
Patient request a call, has questions on orders  Doesn't understand  Also, wanted to know if results of U/S are back

## 2021-09-10 NOTE — TELEPHONE ENCOUNTER
Patient phoned  Upset that she did not yet receive printout of her ultrasound results in the mail yet  Patient insists we call her back with confirmation that the paperwork has been sent

## 2021-09-28 ENCOUNTER — CONSULT (OUTPATIENT)
Dept: SURGERY | Facility: CLINIC | Age: 71
End: 2021-09-28

## 2021-09-28 VITALS — TEMPERATURE: 96.3 F | BODY MASS INDEX: 15.74 KG/M2 | WEIGHT: 92.2 LBS | HEIGHT: 64 IN

## 2021-09-28 DIAGNOSIS — Z90.49 STATUS POST SMALL BOWEL RESECTION: Primary | ICD-10-CM

## 2021-09-28 PROCEDURE — 99024 POSTOP FOLLOW-UP VISIT: CPT | Performed by: SURGERY

## 2021-09-28 NOTE — ASSESSMENT & PLAN NOTE
I explained her her incision is fine she may have sutures palpable of the rest of her life  I told her the options are leave everything as is and do nothing or go to the operating room and remove all the knots  She would prefer not do anything surgical   We will see her back here if needed

## 2021-09-28 NOTE — PROGRESS NOTES
Office Visit - General Surgery  Oleksandr Otero MRN: [id number]  Encounter: [id number]    Assessment and Plan    Problem List Items Addressed This Visit        Other    Status post small bowel resection - Primary       I explained her her incision is fine she may have sutures palpable of the rest of her life  I told her the options are leave everything as is and do nothing or go to the operating room and remove all the knots  She would prefer not do anything surgical   We will see her back here if needed  Chief Complaint:  Oleksandr Otero is a 66 y o  female who presents for Follow-up (suture at incision site )    Subjective   75-year-old female returns for wound evaluation  No new complaints  Notices a bump at the incision site    Past Medical History  Past Medical History:   Diagnosis Date    Anxiety     Seasonal allergies        Past Surgical History  Past Surgical History:   Procedure Laterality Date    EXPLORATORY LAPAROTOMY W/ BOWEL RESECTION N/A 8/22/2020    Procedure: LAPAROTOMY EXPLORATORY, REDUCTION OF INTERNAL HERNIA;  Surgeon: Karla Mayer MD;  [facility];  Service: General    SMALL INTESTINE SURGERY  8/22/2020    Procedure: RESECTION SMALL BOWEL X2   SMALL BOWEL ANASTOMOSIS X2 ;  Surgeon: Karla Mayer MD;  [facility];  Service: General       Family History  Family History   Problem Relation Age of Onset    No Known Problems Mother        Social History  Social History     Socioeconomic History    Marital status: Single     Spouse name: None    Number of children: None    Years of education: None    Highest education level: None   Occupational History    None   Tobacco Use    Smoking status: Light Tobacco Smoker    Smokeless tobacco: Never Used   Substance and Sexual Activity    Alcohol use: Never    Drug use: No    Sexual activity: Never   Other Topics Concern    None   Social History Narrative    None     Social Determinants of Health     Financial Resource Strain:     Difficulty of Paying Living Expenses:    Food Insecurity:     Worried About Running Out of Food in the Last Year:     [address] in the Last Year:    Transportation Needs:     Lack of Transportation (Medical):  Lack of Transportation (Non-Medical):    Physical Activity:     Days of Exercise per Week:     Minutes of Exercise per Session:    Stress:     Feeling of Stress :    Social Connections:     Frequency of Communication with Friends and Family:     Frequency of Social Gatherings with Friends and Family:     Attends Gnosticism Services:     Active Member of Clubs or Organizations:     Attends Club or Organization Meetings:     Marital Status:    Intimate Partner Violence:     Fear of Current or Ex-Partner:     Emotionally Abused:     Physically Abused:     Sexually Abused:         Medications  Current Outpatient Medications on File Prior to Visit   Medication Sig Dispense Refill    ibuprofen (ADVIL,MOTRIN) 100 MG tablet Take 100 mg by mouth every 6 (six) hours as needed for mild pain       No current facility-administered medications on file prior to visit  Allergies  Allergies   Allergen Reactions    Tetracyclines & Related Hives       Review of Systems    Objective  Vitals:    09/28/21 1140   Temp: (!) 96 3 °F (35 7 °C)       Physical Exam    abdomen:  Midline incision well healed there  A few suture knots palpable underneath the skin

## 2021-11-16 ENCOUNTER — PROCEDURE VISIT (OUTPATIENT)
Dept: SURGERY | Facility: CLINIC | Age: 71
End: 2021-11-16

## 2021-11-16 VITALS — HEIGHT: 64 IN | WEIGHT: 91.4 LBS | BODY MASS INDEX: 15.6 KG/M2 | TEMPERATURE: 97.4 F

## 2021-11-16 DIAGNOSIS — Z90.49 STATUS POST SMALL BOWEL RESECTION: Primary | ICD-10-CM

## 2021-11-16 PROCEDURE — 99211 OFF/OP EST MAY X REQ PHY/QHP: CPT | Performed by: SURGERY

## 2022-01-03 ENCOUNTER — TELEPHONE (OUTPATIENT)
Dept: FAMILY MEDICINE CLINIC | Facility: CLINIC | Age: 72
End: 2022-01-03

## 2022-07-21 ENCOUNTER — OFFICE VISIT (OUTPATIENT)
Dept: PODIATRY | Facility: CLINIC | Age: 72
End: 2022-07-21

## 2022-07-21 VITALS
HEART RATE: 51 BPM | SYSTOLIC BLOOD PRESSURE: 118 MMHG | HEIGHT: 64 IN | WEIGHT: 90.6 LBS | DIASTOLIC BLOOD PRESSURE: 65 MMHG | BODY MASS INDEX: 15.47 KG/M2

## 2022-07-21 DIAGNOSIS — M20.42 HAMMER TOE OF LEFT FOOT: ICD-10-CM

## 2022-07-21 DIAGNOSIS — L60.3 NAIL DYSTROPHY: ICD-10-CM

## 2022-07-21 DIAGNOSIS — L84 CORNS: Primary | ICD-10-CM

## 2022-07-21 PROCEDURE — 99212 OFFICE O/P EST SF 10 MIN: CPT | Performed by: PODIATRIST

## 2022-07-21 NOTE — PROGRESS NOTES
Patient presents for palliative foot care  Patient has a painful corn on the tip of the left 3rd toe due to hammertoe deformity  Also multiple punctate hyperkeratotic lesions and long and dystrophic toenails  Treatment consisted of nail and lesion trimming

## 2022-08-02 ENCOUNTER — TELEPHONE (OUTPATIENT)
Dept: PODIATRY | Facility: CLINIC | Age: 72
End: 2022-08-02

## 2023-01-30 ENCOUNTER — TELEPHONE (OUTPATIENT)
Dept: FAMILY MEDICINE CLINIC | Facility: CLINIC | Age: 73
End: 2023-01-30

## 2023-01-30 NOTE — TELEPHONE ENCOUNTER
I called patient but was not able to leave voicemail  Will relay information to patient when they call

## 2023-01-30 NOTE — TELEPHONE ENCOUNTER
Patient ([phone]) called to ask what out of pocket price would be for pneumonia vaccine in office as insurance dose not cover  I checked and we do not perform pneumonia vaccines in office unless covered by insurance  Called patient but no voicemail set up  Unable to leave message

## 2023-01-31 NOTE — TELEPHONE ENCOUNTER
Patient returned call and left message  Spoke to patient and confirmed that vaccine could not be done in office and that we do not have out of pocket prices available  Patient reports already having vaccine done at pharmacy  States she just wanted to compare prices

## 2023-03-07 ENCOUNTER — OFFICE VISIT (OUTPATIENT)
Dept: FAMILY MEDICINE CLINIC | Facility: CLINIC | Age: 73
End: 2023-03-07

## 2023-03-07 VITALS
OXYGEN SATURATION: 96 % | BODY MASS INDEX: 15.03 KG/M2 | HEART RATE: 83 BPM | SYSTOLIC BLOOD PRESSURE: 96 MMHG | TEMPERATURE: 98 F | HEIGHT: 64 IN | WEIGHT: 88 LBS | DIASTOLIC BLOOD PRESSURE: 62 MMHG | RESPIRATION RATE: 16 BRPM

## 2023-03-07 DIAGNOSIS — J20.8 ACUTE BRONCHITIS DUE TO OTHER SPECIFIED ORGANISMS: ICD-10-CM

## 2023-03-07 DIAGNOSIS — J06.9 ACUTE UPPER RESPIRATORY INFECTION: Primary | ICD-10-CM

## 2023-03-07 RX ORDER — BENZONATATE 100 MG/1
100 CAPSULE ORAL 3 TIMES DAILY PRN
Qty: 20 CAPSULE | Refills: 0 | Status: SHIPPED | OUTPATIENT
Start: 2023-03-07

## 2023-03-07 RX ORDER — AZITHROMYCIN 250 MG/1
TABLET, FILM COATED ORAL
Qty: 6 TABLET | Refills: 0 | Status: SHIPPED | OUTPATIENT
Start: 2023-03-07 | End: 2023-03-11

## 2023-03-07 NOTE — PROGRESS NOTES
Name: Elise Pete      :       MRN: [id number]  Encounter Provider: Aishwarya Kohler MD  Encounter Date: 3/7/2023   Encounter department: [facility] [address]    Chief Complaint   Patient presents with   • Cold Like Symptoms     Cough      Health Maintenance   Topic Date Due   • Hepatitis C Screening  Never done   • Pneumococcal Vaccine: 65+ Years (1 - PCV) Never done   • Annual Physical  Never done   • DTaP,Tdap,and Td Vaccines (1 - Tdap) Never done   • Breast Cancer Screening: Mammogram  Never done   • Colorectal Cancer Screening  Never done   • Osteoporosis Screening  Never done   • Urinary Incontinence Screening  Never done   • BMI: Followup Plan  2021   • COVID-19 Vaccine (4 - Booster for Moderna series) 2022   • Fall Risk  2022   • Influenza Vaccine (1) 2022   • Depression Screening  2024   • BMI: Adult  2024   • HIB Vaccine  Aged Out   • IPV Vaccine  Aged Out   • Hepatitis A Vaccine  Aged Out   • Meningococcal ACWY Vaccine  Aged Out   • HPV Vaccine  Aged Out       Assessment & Plan     1  Acute upper respiratory infection  Assessment & Plan:  Recommended to increase fluid intake  Take Tessalon 100 mg 3 times daily as needed for cough  Orders:  -     benzonatate (TESSALON PERLES) 100 mg capsule; Take 1 capsule (100 mg total) by mouth 3 (three) times a day as needed for cough    2  Acute bronchitis due to other specified organisms  Assessment & Plan:  Start Zithromax for 5 days, Tessalon 100 mg 3 times daily as needed for cough  Call office if symptoms persist or worsen  Orders:  -     azithromycin (ZITHROMAX) 250 mg tablet; Take 2 tablets 1 st day, then 1 tablet daily for 4 days, then stop    BMI Counseling: Body mass index is 15 11 kg/m²  The BMI is below normal  Patient advised to gain weight  Rationale for BMI follow-up plan is due to patient being underweight       Depression Screening and Follow-up Plan: Patient was screened for depression during today's encounter  They screened negative with a PHQ-2 score of 0  Subjective      HPI     Patient presents to the office c/o cold symptoms for the past 2 weeks  C/o dry cough, some nasal congestion  Reports no fever or chills  No wheezing or chest tightness  Stopped smoking since got sick  Assessment/Plan:    Problem List Items Addressed This Visit        Respiratory    Acute upper respiratory infection - Primary     Recommended to increase fluid intake  Take Tessalon 100 mg 3 times daily as needed for cough  Relevant Medications    azithromycin (ZITHROMAX) 250 mg tablet    benzonatate (TESSALON PERLES) 100 mg capsule    Acute bronchitis due to other specified organisms     Start Zithromax for 5 days, Tessalon 100 mg 3 times daily as needed for cough  Call office if symptoms persist or worsen  Relevant Medications    azithromycin (ZITHROMAX) 250 mg tablet    benzonatate (TESSALON PERLES) 100 mg capsule       Review of Systems   Constitutional: Negative for activity change, appetite change, chills, fatigue and fever  HENT: Positive for congestion (mild)  Negative for ear pain, sore throat and trouble swallowing  Eyes: Negative  Respiratory: Positive for cough (dry)  Negative for chest tightness, shortness of breath and wheezing  Cardiovascular: Negative for chest pain, palpitations and leg swelling  Gastrointestinal: Negative for abdominal pain, diarrhea, nausea and vomiting  Musculoskeletal: Negative for arthralgias and back pain  Skin: Negative for rash  Neurological: Negative for dizziness and headaches  Hematological: Negative      Psychiatric/Behavioral:        Anxiety       Current Outpatient Medications on File Prior to Visit   Medication Sig   • ibuprofen (ADVIL,MOTRIN) 100 MG tablet Take 100 mg by mouth every 6 (six) hours as needed for mild pain       Objective     BP 96/62 (BP Location: Left arm, Patient Position: Sitting)   Pulse 83   Temp 98 °F (36 7 °C) (Tympanic)   Resp 16   Ht 5' 4" (1 626 m)   Wt 39 9 kg (88 lb)   SpO2 96%   BMI 15 11 kg/m²     Physical Exam  Vitals and nursing note reviewed  Constitutional:       Appearance: Normal appearance  Eyes:      Conjunctiva/sclera: Conjunctivae normal       Pupils: Pupils are equal, round, and reactive to light  Cardiovascular:      Rate and Rhythm: Normal rate and regular rhythm  Heart sounds: No murmur heard  Pulmonary:      Effort: Pulmonary effort is normal       Breath sounds: No wheezing  Comments: Coarse breath sounds BL  Musculoskeletal:         General: No swelling  Normal range of motion  Cervical back: Normal range of motion and neck supple  No tenderness  Right lower leg: No edema  Left lower leg: No edema  Lymphadenopathy:      Cervical: No cervical adenopathy  Skin:     General: Skin is warm and dry  Findings: No rash  Neurological:      Mental Status: She is alert         Aishwarya Kohler MD

## 2023-03-07 NOTE — ASSESSMENT & PLAN NOTE
Start Zithromax for 5 days, Tessalon 100 mg 3 times daily as needed for cough  Call office if symptoms persist or worsen

## 2023-03-09 ENCOUNTER — TELEPHONE (OUTPATIENT)
Dept: FAMILY MEDICINE CLINIC | Facility: CLINIC | Age: 73
End: 2023-03-09

## 2023-03-09 NOTE — TELEPHONE ENCOUNTER
Patient left message asking if she can take Advil with the z pac that was prescribed  Wants to take because she has a headache

## 2023-05-06 PROBLEM — J06.9 ACUTE UPPER RESPIRATORY INFECTION: Status: RESOLVED | Noted: 2023-03-07 | Resolved: 2023-05-06

## 2023-05-06 PROBLEM — J20.8 ACUTE BRONCHITIS DUE TO OTHER SPECIFIED ORGANISMS: Status: RESOLVED | Noted: 2023-03-07 | Resolved: 2023-05-06

## 2023-06-05 ENCOUNTER — OFFICE VISIT (OUTPATIENT)
Dept: PHYSICAL THERAPY | Facility: MEDICAL CENTER | Age: 73
End: 2023-06-05

## 2023-06-05 DIAGNOSIS — G89.29 CHRONIC NECK PAIN: Primary | ICD-10-CM

## 2023-06-05 DIAGNOSIS — M54.2 CHRONIC NECK PAIN: Primary | ICD-10-CM

## 2023-06-05 PROCEDURE — 97161 PT EVAL LOW COMPLEX 20 MIN: CPT | Performed by: PHYSICAL THERAPIST

## 2023-06-05 NOTE — TELEPHONE ENCOUNTER
Additional Information  • Negative: Is this related to a work injury? • Negative: Is this related to an MVA? • Negative: Are you currently recieving homecare services? • Negative: Has the patient had unexplained weight loss? • Negative: Does the patient have a fever? • Negative: Is the patient experiencing urine retention? • Negative: Is the patient experiencing acute drop foot or paralysis? • Negative: Has the patient experienced major trauma? (fall from height, high speed collision, direct blow to spine) and is also experiencing nausea, light-headedness, or loss of consciousness? • Negative: Is the patient experiencing blood in sputum?     Protocols used: [facility] COMPREHENSIVE SPINE PROGRAM PROTOCOL

## 2023-06-05 NOTE — TELEPHONE ENCOUNTER
Additional Information  • Negative: Is this related to a work injury? • Negative: Is this related to an MVA?     Protocols used: [facility] COMPREHENSIVE SPINE PROGRAM PROTOCOL

## 2023-06-05 NOTE — TELEPHONE ENCOUNTER

## 2023-06-05 NOTE — TELEPHONE ENCOUNTER
Additional Information  • Negative: Is this related to a work injury?     Protocols used: [facility] COMPREHENSIVE SPINE PROGRAM PROTOCOL

## 2023-06-05 NOTE — TELEPHONE ENCOUNTER
Additional Information  • Negative: Is this related to a work injury? • Negative: Is this related to an MVA? • Negative: Are you currently recieving homecare services? • Negative: Has the patient had unexplained weight loss? • Negative: Does the patient have a fever? • Negative: Is the patient experiencing urine retention? • Negative: Is the patient experiencing acute drop foot or paralysis?     Protocols used: [facility] COMPREHENSIVE SPINE PROGRAM PROTOCOL

## 2023-06-05 NOTE — TELEPHONE ENCOUNTER
Additional Information  • Negative: Is this related to a work injury? • Negative: Is this related to an MVA? • Negative: Are you currently recieving homecare services?     Protocols used: [facility] COMPREHENSIVE SPINE PROGRAM PROTOCOL

## 2023-06-05 NOTE — TELEPHONE ENCOUNTER
Additional Information  • Negative: Is this related to a work injury? • Negative: Is this related to an MVA? • Negative: Are you currently recieving homecare services? • Negative: Has the patient had unexplained weight loss? • Negative: Does the patient have a fever?     Protocols used: [facility] COMPREHENSIVE SPINE PROGRAM PROTOCOL

## 2023-06-05 NOTE — TELEPHONE ENCOUNTER
Additional Information  • Negative: Is this related to a work injury? • Negative: Is this related to an MVA? • Negative: Are you currently recieving homecare services? • Negative: Has the patient had unexplained weight loss? • Negative: Does the patient have a fever? • Negative: Is the patient experiencing urine retention? • Negative: Is the patient experiencing acute drop foot or paralysis? • Negative: Has the patient experienced major trauma? (fall from height, high speed collision, direct blow to spine) and is also experiencing nausea, light-headedness, or loss of consciousness? • Negative: Is the patient experiencing blood in sputum? • Affirmative: Is this a chronic condition?     Protocols used: [facility] COMPREHENSIVE SPINE PROGRAM PROTOCOL

## 2023-06-05 NOTE — TELEPHONE ENCOUNTER
Additional Information  • Negative: Is this related to a work injury? • Negative: Is this related to an MVA? • Negative: Are you currently recieving homecare services? • Negative: Has the patient had unexplained weight loss?     Protocols used:  [facility] COMPREHENSIVE SPINE PROGRAM PROTOCOL

## 2023-06-05 NOTE — PROGRESS NOTES
PT Evaluation     Today's date: 2023  Patient name: Mae Lane  :   MRN: [id number]  Referring provider: Kieth Schlatter, PT  Dx:   Encounter Diagnosis     ICD-10-CM    1  Chronic neck pain  M54 2     G89 29                      Assessment/Plan  Patient is a 68 yo female who presents with symptoms of chronic neck pain secondary to poor posturing, thoracichypmobility, and cervical DJD and DDD   patient also demonstrates increased thoracic kyphosis leading to inappropriate overutilization of mid cervical spine as well as dysfunction of scapulothoracic rhythm   Patient notes headaches are under control most of the time and she is feeling more problems general mobility  She has come to [facility] to address stiffness which was improved with mobilization and gentle stretching   Patient will benefit from skilled Physical therapy services being seen 2x a week for 4-6 weeks  Subjective  Patient states that she has been having neck pain over the past year and a half  Patient had a fall which may have caused some of this pain however she has been having degenerative issues for years  Neck pain has worsened at work ([employer]) and she notes that by the end of the day she is in severe discomfort  Patient would like to work with less pain and return to 3 days a week instead of 2  Objective  Red Flags:  none  Pain: 7/10  Reflexes: 2/5 BUE  Posture: forward head with rounded shoulders   increased thoracic kyphosis  AROM: limited with all movements of cervical spine   Most discomfort with left rotation     PROM: deferred  PAROM: limited with comparable sign with UPA of left C3-T5 and associated ribs     Palpation: TTP of left upper traps and left lateral neck musculature  Special Tests: - dural sign,  No signs of radiculopathy, upper quarter screen normal, normal sensation  Coordination of Movement/strengthe: Patient demonstrates poor activation of Longus Capitus and Longus Coli with loss of feed forward mechanism with reaching tasks    Patient was able to perform activation with cueing   Poor thoracic movement and disrupted ST rhythm       Goals:  - Pt I with initial HEP in 1-2 visits  - Improve ROM to functional without pain    - improved stabilizing structure activation and improved feed forward mechanism with distal activation  - Increase Functional Status Measure measured by FOTO by [facility]  - working 3 days a week as desired            Precautions: none

## 2023-06-05 NOTE — TELEPHONE ENCOUNTER
Additional Information  • Negative: Is this related to a work injury? • Negative: Is this related to an MVA? • Negative: Are you currently recieving homecare services? • Negative: Has the patient had unexplained weight loss? • Negative: Does the patient have a fever? • Negative: Is the patient experiencing urine retention?     Protocols used: [facility] COMPREHENSIVE SPINE PROGRAM PROTOCOL

## 2023-06-12 ENCOUNTER — OFFICE VISIT (OUTPATIENT)
Dept: PHYSICAL THERAPY | Facility: MEDICAL CENTER | Age: 73
End: 2023-06-12

## 2023-06-12 DIAGNOSIS — M54.2 CHRONIC NECK PAIN: Primary | ICD-10-CM

## 2023-06-12 DIAGNOSIS — G89.29 CHRONIC NECK PAIN: Primary | ICD-10-CM

## 2023-06-12 PROCEDURE — 97140 MANUAL THERAPY 1/> REGIONS: CPT | Performed by: PHYSICAL THERAPIST

## 2023-06-12 NOTE — PROGRESS NOTES
Daily Note     Today's date: 2023  Patient name: Sindy Joshua  :   MRN: [id number]  Referring provider: Anson Robertson PT  Dx:   Encounter Diagnosis     ICD-10-CM    1  Chronic neck pain  M54 2     G89 29                      Subjective: patient states that she is feeling good with her program   Notes that she has been working on doing her exercises at home  Objective: See treatment diary below      Assessment: Tolerated treatment well  Patient exhibited good technique with therapeutic exercises and would benefit from continued PT  Progressed with posturing program which she tolerated well  Plan: Continue per plan of care        Precautions: none  Daily Treatment Diary     Manual                                                                                   Exercise Diary              UBE 6            pec stretch [id number]            No monnies 15x2 red band            Shoulder extension 15x2 red band            Scapular retractions 15x2 red band            Horizontal abduction 15x2 red band            Chin tucks 15x2            Thoracic extension over foam roller 5secx3            Foam roller on wall                                                                                                                                                                Modalities

## 2023-06-26 ENCOUNTER — OFFICE VISIT (OUTPATIENT)
Dept: PHYSICAL THERAPY | Facility: MEDICAL CENTER | Age: 73
End: 2023-06-26

## 2023-06-26 DIAGNOSIS — G89.29 CHRONIC NECK PAIN: Primary | ICD-10-CM

## 2023-06-26 DIAGNOSIS — M54.2 CHRONIC NECK PAIN: Primary | ICD-10-CM

## 2023-06-26 PROCEDURE — 97140 MANUAL THERAPY 1/> REGIONS: CPT | Performed by: PHYSICAL THERAPIST

## 2023-06-26 NOTE — PROGRESS NOTES
Daily Note     Today's date: 2023  Patient name: Franck Heath  : [id number]  MRN: [id number]  Referring provider: Francisca Goodman PT  Dx:   Encounter Diagnosis     ICD-10-CM    1  Chronic neck pain  M54 2     G89 29                      Subjective: patient states that she is feeling good with her program   Had some discomfort this week but overall working hard on her program and posture  Objective: See treatment diary below      Assessment: Tolerated treatment well  Patient exhibited good technique with therapeutic exercises and would benefit from continued PT  Progressed with posturing program which she tolerated well  Plan: Continue per plan of care        Precautions: none  Daily Treatment Diary     Manual                                                                                   Exercise Diary              UBE 6            pec stretch [id number]            No monnies 15x2 red band            Shoulder extension 15x2 red band            Scapular retractions 15x2 red band            Horizontal abduction 15x2 red band            Chin tucks 15x2            Thoracic extension over foam roller 5secx3            Foam roller on wall                                                                                                                                                                Modalities

## 2023-07-06 ENCOUNTER — OFFICE VISIT (OUTPATIENT)
Dept: PODIATRY | Facility: CLINIC | Age: 73
End: 2023-07-06

## 2023-07-06 VITALS
RESPIRATION RATE: 18 BRPM | SYSTOLIC BLOOD PRESSURE: 111 MMHG | HEART RATE: 74 BPM | DIASTOLIC BLOOD PRESSURE: 68 MMHG | WEIGHT: 89 LBS | BODY MASS INDEX: 15.19 KG/M2 | HEIGHT: 64 IN

## 2023-07-06 DIAGNOSIS — L30.9 ECZEMA, UNSPECIFIED TYPE: ICD-10-CM

## 2023-07-06 DIAGNOSIS — L60.3 NAIL DYSTROPHY: ICD-10-CM

## 2023-07-06 DIAGNOSIS — L84 CORNS: Primary | ICD-10-CM

## 2023-07-06 PROCEDURE — NCFTCARE PR NON-COVERED FOOT CARE: Performed by: PODIATRIST

## 2023-07-06 RX ORDER — CLOBETASOL PROPIONATE 0.5 MG/G
CREAM TOPICAL 2 TIMES DAILY
Qty: 15 G | Refills: 0 | Status: SHIPPED | OUTPATIENT
Start: 2023-07-06 | End: 2023-07-20

## 2023-07-06 NOTE — PROGRESS NOTES
Patient presents for palliative foot care. Patient complains of corns and calluses. She also complains of a patch of dry scaly skin on the dorsal lateral aspect of each ankle. On exam, pedal pulses are within normal limits. Hyperkeratotic lesions present beneath fifth metatarsal head bilateral.  Lesion is also present on tip of left third toe. Eczematous dermatitis noted dorsal lateral aspect each ankle. Treatment: Prescribed Temovate cream for twice daily application. Trimmed hyperkeratotic lesions and toenails. Reappoint 3 weeks.

## 2023-07-10 ENCOUNTER — TELEPHONE (OUTPATIENT)
Dept: OBGYN CLINIC | Facility: HOSPITAL | Age: 73
End: 2023-07-10

## 2023-07-10 NOTE — TELEPHONE ENCOUNTER
Caller: patient    Doctor: Kd Baugh    Reason for call: patient has questions about the cream she was prescribed and also the L foot middle toe is causing pain and would like advise    Call back#: [phone]

## 2023-07-11 ENCOUNTER — TELEPHONE (OUTPATIENT)
Dept: PODIATRY | Facility: CLINIC | Age: 73
End: 2023-07-11

## 2023-07-11 NOTE — TELEPHONE ENCOUNTER
Caller: patient    Doctor:Rohini    Reason for call: Cream is not working. What should she do? Can we call her back today.  (PT.request)    Call [phone]

## 2023-07-11 NOTE — TELEPHONE ENCOUNTER
Caller: Spencer Dupont    Doctor/Office: Dr. Kelsey Munoz    #: [phone]    Escalation: Care/callous is still painful/heel also/left foot toenail issue/also cream has discolored her skin/made it "Tan/Brown"-is this normal? Please call pt back and advise.

## 2023-07-13 ENCOUNTER — TELEPHONE (OUTPATIENT)
Dept: PODIATRY | Facility: CLINIC | Age: 73
End: 2023-07-13

## 2023-07-13 NOTE — TELEPHONE ENCOUNTER
I called and spoke with the pt and informed her that [address] said to discontinue the cream if is causing and discoloration. Pt stated she will to use it.

## 2023-07-13 NOTE — TELEPHONE ENCOUNTER
Dr.Diamonds SCHRADER did speak to her and gave her the necessary information she needed. It is documented in her chart.

## 2023-07-13 NOTE — TELEPHONE ENCOUNTER
Caller: Shira Mathews    Doctor/Office: Dr. Sera Gusman    #: [phone]    Escalation: Care Patient called multiple times and has not gotten a call back yet this week. Would like a return call from the office today. Thank you.

## 2023-07-13 NOTE — TELEPHONE ENCOUNTER
Caller: Patient    Doctor: Crystal Hsu    Reason for call: Patient wants a call back    Call back#:[phone]

## 2023-07-14 ENCOUNTER — TELEPHONE (OUTPATIENT)
Dept: PODIATRY | Facility: CLINIC | Age: 73
End: 2023-07-14

## 2023-09-11 ENCOUNTER — EVALUATION (OUTPATIENT)
Dept: PHYSICAL THERAPY | Facility: MEDICAL CENTER | Age: 73
End: 2023-09-11

## 2023-09-11 DIAGNOSIS — G89.29 CHRONIC NECK PAIN: Primary | ICD-10-CM

## 2023-09-11 DIAGNOSIS — M54.2 CHRONIC NECK PAIN: Primary | ICD-10-CM

## 2023-09-11 PROCEDURE — 97164 PT RE-EVAL EST PLAN CARE: CPT | Performed by: PHYSICAL THERAPIST

## 2024-02-29 ENCOUNTER — TELEPHONE (OUTPATIENT)
Dept: FAMILY MEDICINE CLINIC | Facility: CLINIC | Age: 74
End: 2024-02-29

## 2024-02-29 NOTE — TELEPHONE ENCOUNTER
Voicemail: Yeah,-I need to find out when I could get an appointment because of my back and my bronchitis, but more so my back. Thank you. If you could call as soon as possible when you get this message I would appreciate it. And plus I can't work, I have to take off from work so I need to find out what's going on because my back's not any better. Thank you.     - I returned call but could not leave message. No mailbox set up.

## 2024-03-02 PROBLEM — J44.1 CHRONIC OBSTRUCTIVE PULMONARY DISEASE WITH ACUTE EXACERBATION (HCC): Status: ACTIVE | Noted: 2024-03-02

## 2024-03-05 ENCOUNTER — OFFICE VISIT (OUTPATIENT)
Dept: FAMILY MEDICINE CLINIC | Facility: CLINIC | Age: 74
End: 2024-03-05

## 2024-03-05 VITALS
HEIGHT: 64 IN | OXYGEN SATURATION: 96 % | DIASTOLIC BLOOD PRESSURE: 72 MMHG | RESPIRATION RATE: 18 BRPM | SYSTOLIC BLOOD PRESSURE: 106 MMHG | TEMPERATURE: 96.5 F | HEART RATE: 74 BPM | WEIGHT: 87.8 LBS | BODY MASS INDEX: 14.99 KG/M2

## 2024-03-05 DIAGNOSIS — J44.9 CHRONIC OBSTRUCTIVE PULMONARY DISEASE, UNSPECIFIED COPD TYPE (HCC): Primary | ICD-10-CM

## 2024-03-05 DIAGNOSIS — Z12.31 BREAST CANCER SCREENING BY MAMMOGRAM: ICD-10-CM

## 2024-03-05 DIAGNOSIS — E44.1 MILD PROTEIN-CALORIE MALNUTRITION (HCC): ICD-10-CM

## 2024-03-05 DIAGNOSIS — Z12.12 SCREENING FOR COLORECTAL CANCER: ICD-10-CM

## 2024-03-05 DIAGNOSIS — M25.551 PAIN OF RIGHT HIP: ICD-10-CM

## 2024-03-05 DIAGNOSIS — F41.9 ANXIETY: ICD-10-CM

## 2024-03-05 DIAGNOSIS — Z87.891 STOPPED SMOKING: ICD-10-CM

## 2024-03-05 DIAGNOSIS — M54.50 ACUTE RIGHT-SIDED LOW BACK PAIN WITHOUT SCIATICA: ICD-10-CM

## 2024-03-05 DIAGNOSIS — Z78.0 POSTMENOPAUSAL: ICD-10-CM

## 2024-03-05 DIAGNOSIS — Z12.11 SCREENING FOR COLORECTAL CANCER: ICD-10-CM

## 2024-03-05 PROCEDURE — 99214 OFFICE O/P EST MOD 30 MIN: CPT | Performed by: FAMILY MEDICINE

## 2024-03-05 RX ORDER — METHOCARBAMOL 500 MG/1
TABLET, FILM COATED ORAL
Qty: 30 TABLET | Refills: 0 | Status: SHIPPED | OUTPATIENT
Start: 2024-03-05

## 2024-03-05 NOTE — ASSESSMENT & PLAN NOTE
Patient presents for ER follow-up visit.  She was seen in [facility] ER on 2/29/24.    Chest x-ray showed hyperinflated lungs with extension of anterior -posterior chest diameter compatible with COPD.    No lung consolidation, pleural effusion, hilar enlargement or cardiomegaly.    Patient was discharged home on Cefuroxime 250 mg twice daily for 10 days, Medrol Dosepak.    She will complete antibiotic therapy today.    Reports improvement in cough.  No fever or chills.      Patient states that she quit smoking last year.    Recommended to schedule evaluation by pulmonology.

## 2024-03-05 NOTE — ASSESSMENT & PLAN NOTE
Will order X-ray right hip.  Take Tylenol Arthritis 650 mg 1 tab. every 8 hr. PRN for pain.  Consider referral to orthopedic surgeon if not improving.

## 2024-03-05 NOTE — ASSESSMENT & PLAN NOTE
Patient developed low back pain radiating to right hip area after shoveling snow.  Tylenol does not provide significant relief in pain.    Pain worsens with certain movements and lifting.  Recommended to check x-ray of the lumbar spine to rule out bone abnormality.    Apply Lidocaine 4% patches.  Consider referral to orthopedic surgeon, PT  if symptoms persist or worsen.

## 2024-03-05 NOTE — PROGRESS NOTES
Name: Natalia Caldera      : 1950      MRN: [id number]  Encounter Provider: Juli Arce MD  Encounter Date: 3/5/2024   Encounter department: [facility]    Assessment & Plan     1. Chronic obstructive pulmonary disease, unspecified COPD type (HCC)  Assessment & Plan:  Patient presents for ER follow-up visit.  She was seen in [facility] ER on 24.    Chest x-ray showed hyperinflated lungs with extension of anterior -posterior chest diameter compatible with COPD.    No lung consolidation, pleural effusion, hilar enlargement or cardiomegaly.    Patient was discharged home on Cefuroxime 250 mg twice daily for 10 days, Medrol Dosepak.    She will complete antibiotic therapy today.    Reports improvement in cough.  No fever or chills.      Patient states that she quit smoking last year.    Recommended to schedule evaluation by pulmonology.    Orders:  -     Ambulatory Referral to Pulmonology; Future    2. Acute right-sided low back pain without sciatica  Assessment & Plan:  Patient developed low back pain radiating to right hip area after shoveling snow.  Tylenol does not provide significant relief in pain.    Pain worsens with certain movements and lifting.  Recommended to check x-ray of the lumbar spine to rule out bone abnormality.    Apply Lidocaine 4% patches.  Consider referral to orthopedic surgeon, PT  if symptoms persist or worsen.        Orders:  -     XR spine lumbar minimum 4 views non injury; Future; Expected date: 2024  -     methocarbamol (ROBAXIN) 500 mg tablet; Take 1 tab. at bedtime for muscle spasms/ back pain    3. Pain of right hip  Assessment & Plan:  Will order X-ray right hip.  Take Tylenol Arthritis 650 mg 1 tab. every 8 hr. PRN for pain.  Consider referral to orthopedic surgeon if not improving.      Orders:  -     XR hip/pelv 2-3 vws right if performed; Future; Expected date: 2024    4. Anxiety  Assessment & Plan:  Recommended to practice  relaxation techniques.        5. Stopped smoking  Assessment & Plan:  Patient quit smoking last year.        6. Screening for colorectal cancer    7. Breast cancer screening by mammogram  -     Mammo screening bilateral w 3d & cad; Future; Expected date: 03/12/2024    8. Postmenopausal  -     DXA bone density spine hip and pelvis; Future; Expected date: 03/12/2024    9. Mild protein-calorie malnutrition (HCC)  Assessment & Plan:  Malnutrition Findings:                                 BMI Findings:           Body mass index is 15.07 kg/m².     Recommended to follow- a well balanced diet.  Supplement diet with Ensure.                 Subjective      HPI    Patient presents for ER follow-up visit.    She was seen at [facility] on 2/29/24 for cough and worsening back pain after shoveling snow.    Check X-ray was suspicious for pneumonia.   She was referred to [facility] ER for further evaluation.    Chest x-ray showed hyperinflated lungs with extension of anterior -posterior chest diameter compatible with COPD.    No lung consolidation, pleural effusion, hilar enlargement or cardiomegaly.    Patient was prescribed Cefuroxime 250 mg twice daily for 10 days, Medrol Dosepak.  She was recommended to use Lidocaine 4 % patches for back pain.    Patient reports improvement in cough. No fever or chills.    Still c/o low back pain radiating to right hip.  Takes Tylenol PRN  with minimal improvement in symptoms.      Back pain worsens with certain movements, lifting.    Patient works 2 days at [employer], not able to return back to work this week.    Denies changes in bowels or urination.No numbness, tingling in lower extremities.    Patient has a long history of tobacco use.  Quit smoking last year.    Patient feels anxious. She does not believe that has COPD.    She does not have medical insurance and tried to avoid diagnostic testing.          Review of Systems   Constitutional:  Positive for fatigue. Negative for  "activity change, appetite change, chills and fever.   HENT:  Negative for congestion, hearing loss, sore throat and trouble swallowing.    Eyes:  Negative for pain, discharge, redness, itching and visual disturbance.   Respiratory:  Positive for cough (improved). Negative for chest tightness, shortness of breath and wheezing.    Cardiovascular:  Negative for chest pain, palpitations and leg swelling.   Gastrointestinal:  Negative for abdominal pain, constipation, diarrhea, nausea and vomiting.   Genitourinary:  Negative for difficulty urinating, dysuria and hematuria.   Musculoskeletal:  Positive for arthralgias (right hip pain) and back pain. Negative for gait problem and joint swelling.   Skin:  Negative for rash.   Neurological:  Negative for dizziness, syncope and headaches.   Hematological: Negative.    Psychiatric/Behavioral:  Positive for sleep disturbance. The patient is nervous/anxious.        Current Outpatient Medications on File Prior to Visit   Medication Sig    benzonatate (TESSALON PERLES) 100 mg capsule Take 1 capsule (100 mg total) by mouth 3 (three) times a day as needed for cough    ibuprofen (ADVIL,MOTRIN) 100 MG tablet Take 100 mg by mouth every 6 (six) hours as needed for mild pain    clobetasol (TEMOVATE) 0.05 % cream Apply topically 2 (two) times a day for 14 days       Objective     /72   Pulse 74   Temp (!) 96.5 °F (35.8 °C) (Tympanic)   Resp 18   Ht 5' 4\" (1.626 m)   Wt 39.8 kg (87 lb 12.8 oz)   SpO2 96%   BMI 15.07 kg/m²     Physical Exam  Vitals and nursing note reviewed.   Constitutional:       Appearance: Normal appearance.   HENT:      Head: Normocephalic and atraumatic.   Eyes:      Conjunctiva/sclera: Conjunctivae normal.      Pupils: Pupils are equal, round, and reactive to light.   Cardiovascular:      Rate and Rhythm: Normal rate and regular rhythm.      Heart sounds: No murmur heard.  Pulmonary:      Effort: Pulmonary effort is normal.      Comments: Coarse breath " sounds  Abdominal:      General: Abdomen is flat. Bowel sounds are normal. There is no distension.      Palpations: Abdomen is soft.      Tenderness: There is no abdominal tenderness.   Musculoskeletal:      Cervical back: Normal range of motion and neck supple.      Right lower leg: No edema.      Left lower leg: No edema.      Comments: Low back: no spinal tenderness.  Decreased ROM with flexion, extension.  SLR test is neg. BL.    Right hip: mild tenderness over trochanteric bursa.    Skin:     General: Skin is warm and dry.      Findings: No rash.   Neurological:      Mental Status: She is alert.   Psychiatric:      Comments: Fells anxious       Juli Arce MD

## 2024-03-05 NOTE — ASSESSMENT & PLAN NOTE
Malnutrition Findings:                                 BMI Findings:           Body mass index is 15.07 kg/m².     Recommended to follow- a well balanced diet.  Supplement diet with Ensure.

## 2024-03-06 ENCOUNTER — TELEPHONE (OUTPATIENT)
Dept: FAMILY MEDICINE CLINIC | Facility: CLINIC | Age: 74
End: 2024-03-06

## 2024-03-06 NOTE — TELEPHONE ENCOUNTER
Voicemail: Hi, sorry to bother you. I just was curious about I don't know what when I'm gonna do the X-rays, but there's so many for the hip and pelvis and in that area. So I just need someone to tell me which one I'm supposed to do first Because there's a spine, hip, and pelvis. I hit them pelvis and I'm just a little confused. I don't know spine lumber and I'm just I don't know which ones have to get done first or when. So if somebody could call me and let me know, Thank you [phone]. And I don't know when I'm gonna go to get this done, more than likely as soon as I could. But there's a lot of bills going on right now, so I have to maybe wait. And those pain pills, I don't know why 30 pills were ordered. And I read about the dizziness and it's kind of getting me scared. I didn't take them yet, but it just said may may cause drowsiness, May cause dizziness, May cause blurred vision. I don't know if I'm gonna take those pill she prescribed because that really makes me very nervous. I live here alone. I don't wanna fall or anything with dizziness or any of that. If you could tell me what to do. Thank you. I'm sorry to bother you, girls. Bye.    -I returned call and explained the two x-ray orders would be done at the same session and gave information to have done as a walk in. I also made patient aware that the side effects are the same for most pain medications and that medication can be taken as needed- no requirement to take full 30 day supply. Patient states she dose not feel safe taking medication due to side effects. Asks to make provider aware.    Yes

## 2024-03-08 ENCOUNTER — TELEPHONE (OUTPATIENT)
Dept: FAMILY MEDICINE CLINIC | Facility: CLINIC | Age: 74
End: 2024-03-08

## 2024-03-08 NOTE — TELEPHONE ENCOUNTER
Call placed to pt.  Unable to leave message as pt does not have VM.  Please give pt MD instructions when she returns this call.

## 2024-03-08 NOTE — TELEPHONE ENCOUNTER
Voicemail: Hi, I just have a question. I'm sure she knew that I went to a chiropractor back in May and also I was doing physical therapy up at [facility] in [place]- I wonder if she knows that I have severe degenerative disc disease, if that has anything to do with my back pain. And I just thought of that now to tell her and I have to get there for my X-rays. I didn't get there yet. I'd like to go today or tomorrow and then when I know I'm not driving I could try those pills. But could you let me know if she knows that I was at [facility] with a referral from Dexter Hagan when I was going to a chiropractor for my neck pain?  I'll be home yet, but my neck and my back hurts a lot today So if somebody could let me know what's going on, I would appreciate it.     - Please confirm if provider was aware of diagnosis or received records from MRI.

## 2024-03-12 ENCOUNTER — HOSPITAL ENCOUNTER (OUTPATIENT)
Dept: RADIOLOGY | Facility: HOSPITAL | Age: 74
Discharge: HOME | End: 2024-03-12

## 2024-03-12 ENCOUNTER — TELEPHONE (OUTPATIENT)
Dept: FAMILY MEDICINE CLINIC | Facility: CLINIC | Age: 74
End: 2024-03-12

## 2024-03-12 DIAGNOSIS — M25.551 PAIN OF RIGHT HIP: ICD-10-CM

## 2024-03-12 DIAGNOSIS — M54.50 ACUTE RIGHT-SIDED LOW BACK PAIN WITHOUT SCIATICA: ICD-10-CM

## 2024-03-12 PROBLEM — M80.00XA AGE-RELATED OSTEOPOROSIS WITH CURRENT PATHOLOGICAL FRACTURE: Status: ACTIVE | Noted: 2024-03-12

## 2024-03-12 PROBLEM — S32.010A COMPRESSION FRACTURE OF L1 LUMBAR VERTEBRA (HCC): Status: ACTIVE | Noted: 2024-03-12

## 2024-03-12 PROCEDURE — 72110 X-RAY EXAM L-2 SPINE 4/>VWS: CPT

## 2024-03-12 PROCEDURE — 73502 X-RAY EXAM HIP UNI 2-3 VIEWS: CPT

## 2024-03-12 NOTE — TELEPHONE ENCOUNTER
Voicemail: Hi, sorry I just got done speaking to Loida and I'm still keeping the appointment you gave me for the 14th at 10:45. But I'm a little nervous and I don't know how I'm supposed to get through the rest of today and tomorrow until Thursday. I just want you to ask her, is it something bad that I need to know right away? Because I won't even be able to function if I go to work tomorrow and then to wait till Thursday because X-rays are never read that fast. I'm not asking you to get the results. I realized she has to talk to me person. I just want to know if it's something I'm supposed to be worried about because I can't live like this with this fear and think something's wrong if you called me back that fast. So could you please call me and just let me know if it's something I should be concerned about before I get there Thursday because I'm very nervous and upset right now. My name's Brook Lemus, [phone]. I just think this is very strange that the X-rays that she got the results already there's something wrong. Then I need to just know if it's bad. Please help me. Thank you.    - I returned call and addressed concerns. Made patient aware there is no immediate cause for concern and continue taking medications as instructed until appointment. Also advised to bring FMLA forms to visit if possible. Discuss return to work with employer tomorrow.

## 2024-03-12 NOTE — TELEPHONE ENCOUNTER
Voicemail: This is Brook Lemus. I just received a lot of paperwork from my claims department, my HR department and they told me at work. I called and I'm leaving here before 11 to go get X-rayed. My neighbor is taking me also. I need for somebody to call me back and let me know how I go about this. Do I fill it out and give it to my HR and they fax it to you? I need some forms filled out by Doctor Calero. Could somebody call me and let me know when I could drop it off? I don't know if I could do it today, but I'm gonna try. But I do have a neighbor taking me to get those back X-rays today. Could you call me back, please? Because I don't know how I go about getting these forms to doctor. Please call me back so that I know what to do about. I just, I don't know what to do. There is all this paperwork and Doctor Moira has to fill out that. I was there last week and I don't know if I have to fill out all the other places I was at. Could you help me? Thank you. Bye.    - I returned call and gave instructions to drop off FMLA or fax to office. Advised to complete patient section prior to bringing to office and asked to include note with clear instructions for provided.

## 2024-03-13 ENCOUNTER — TELEPHONE (OUTPATIENT)
Age: 74
End: 2024-03-13

## 2024-03-14 ENCOUNTER — OFFICE VISIT (OUTPATIENT)
Dept: FAMILY MEDICINE CLINIC | Facility: CLINIC | Age: 74
End: 2024-03-14

## 2024-03-14 VITALS
DIASTOLIC BLOOD PRESSURE: 72 MMHG | SYSTOLIC BLOOD PRESSURE: 110 MMHG | OXYGEN SATURATION: 95 % | HEART RATE: 83 BPM | BODY MASS INDEX: 14.82 KG/M2 | WEIGHT: 86.8 LBS | HEIGHT: 64 IN | TEMPERATURE: 96.5 F | RESPIRATION RATE: 18 BRPM

## 2024-03-14 DIAGNOSIS — S32.010A COMPRESSION FRACTURE OF L1 VERTEBRA, INITIAL ENCOUNTER (HCC): Primary | ICD-10-CM

## 2024-03-14 DIAGNOSIS — M80.00XA AGE-RELATED OSTEOPOROSIS WITH CURRENT PATHOLOGICAL FRACTURE, INITIAL ENCOUNTER: ICD-10-CM

## 2024-03-14 DIAGNOSIS — M54.50 ACUTE RIGHT-SIDED LOW BACK PAIN WITHOUT SCIATICA: ICD-10-CM

## 2024-03-14 PROCEDURE — 99213 OFFICE O/P EST LOW 20 MIN: CPT | Performed by: FAMILY MEDICINE

## 2024-03-14 RX ORDER — MELATONIN: Start: 2024-03-14

## 2024-03-14 RX ORDER — B-COMPLEX WITH VITAMIN C
1 TABLET ORAL
Start: 2024-03-14

## 2024-03-14 NOTE — PROGRESS NOTES
Name: Natalia Caldera      : 1950      MRN: [id number]  Encounter Provider: Juli Arce MD  Encounter Date: 3/14/2024   Encounter department: [facility]  Chief Complaint   Patient presents with    Follow-up     Review results     Health Maintenance   Topic Date Due    Hepatitis C Screening  Never done    Annual Physical  Never done    DTaP,Tdap,and Td Vaccines (1 - Tdap) Never done    Breast Cancer Screening: Mammogram  Never done    Colorectal Cancer Screening  Never done    Osteoporosis Screening  Never done    Zoster Vaccine (1 of 2) Never done    PT PLAN OF CARE  2023    COVID-19 Vaccine ( season) 2024    Fall Risk  2024    Depression Screening  2025    Urinary Incontinence Screening  2025    Pneumococcal Vaccine: 65+ Years  Completed    Influenza Vaccine  Completed    HIB Vaccine  Aged Out    IPV Vaccine  Aged Out    Hepatitis A Vaccine  Aged Out    Meningococcal ACWY Vaccine  Aged Out    HPV Vaccine  Aged Out     Assessment & Plan     1. Compression fracture of L1 vertebra, initial encounter ([facility])  Assessment & Plan:  X-ray of the lumbar spine done on 2024 showed new since 2020 advanced L1 compression fracture.    Patient reports improvement in low back since last week.  Take Tylenol as needed for pain.    Recommend evaluation by orthopedic surgeon.      Schedule DEXA scan.      Orders:  -     Ambulatory Referral to Orthopedic Surgery; Future  -     DXA bone density spine hip and pelvis; Future; Expected date: 2024    2. Acute right-sided low back pain without sciatica  Assessment & Plan:  Patient developed low back pain radiating to right hip area after shoveling snow 3 weeks ago.    Reports some improvement in pain since last week.    Recommended to take Methocarbamol 500 mg 1 tablet at bedtime as needed for muscle spasms.    Apply lidocaine 4% patches.    Take Tylenol PRN.      Orders:  -     Ambulatory  Referral to Orthopedic Surgery; Future    3. Age-related osteoporosis with current pathological fracture, initial encounter  Assessment & Plan:  X-ray lumbar spine done on 3/12/24 showed new since August 2020 advanced L1 compression fracture.    Stable L4 and L5 compression fractures.  Recommended to schedule DEXA scan.  Discussed treatment options for osteoporosis including Fosamax, Boniva, Reclast infusion, Prolia injections.    Reviewed side effects.    Patient is scheduled for dental work.    Recommended to get clearance from dentist before starting medical therapy for osteoporosis.    Advised to start Vit D 2000 IU daily, total calcium intake of 1200 mg daily from dietary sources and supplements.    Discussed fall precautions.    Check CMP, vit D 25 OH, PTH.    Orders:  -     DXA bone density spine hip and pelvis; Future; Expected date: 03/20/2024  -     Comprehensive metabolic panel; Future; Expected date: 03/20/2024  -     PTH, Intact and Calcium; Future  -     Vitamin D 25 hydroxy; Future  -     cholecalciferol (VITAMIN D3) 1,000 units tablet; Take 2 tab. daily  -     calcium carbonate-vitamin D 500 mg-5 mcg per tablet; Take 1 tablet by mouth daily with breakfast           Schedule follow-up visit, physical exam in 3 months.    Subjective      HPI    Patient presents to the office for follow-up visit for low back pain, reviewed test results.    Patient works at [employer] part-time as a .  Returned back to work yesterday.  Reports some improvement in low back pain and right hip pain.  Takes Methocarbamol 500 mg 1 tab. at bedtime and Tylenol PRN.    X-ray lumbar spine done on March 12, 2024 showed new since August 2020 advanced L1 compression fracture.    Stable L4 and L5 compression fractures.    Patient quit smoking last year.  Denies recent falls.    Patient does not take any dietary supplements or vitamins.      Review of Systems   Constitutional:  Positive for fatigue (mild). Negative for activity  "change, appetite change, chills and fever.   HENT:  Negative for congestion and hearing loss.    Respiratory:  Negative for cough, chest tightness, shortness of breath and wheezing.    Cardiovascular:  Negative for chest pain, palpitations and leg swelling.   Gastrointestinal:  Negative for abdominal pain, constipation, diarrhea, nausea and vomiting.   Genitourinary:  Negative for difficulty urinating, dysuria and hematuria.   Musculoskeletal:  Positive for arthralgias (pain in right hip improved) and back pain. Negative for gait problem and joint swelling.   Skin:  Negative for rash.   Neurological:  Negative for dizziness, syncope and headaches.   Hematological: Negative.    Psychiatric/Behavioral:  Positive for sleep disturbance. Negative for dysphoric mood. The patient is nervous/anxious.        Current Outpatient Medications on File Prior to Visit   Medication Sig    benzonatate (TESSALON PERLES) 100 mg capsule Take 1 capsule (100 mg total) by mouth 3 (three) times a day as needed for cough    ibuprofen (ADVIL,MOTRIN) 100 MG tablet Take 100 mg by mouth every 6 (six) hours as needed for mild pain    methocarbamol (ROBAXIN) 500 mg tablet Take 1 tab. at bedtime for muscle spasms/ back pain    clobetasol (TEMOVATE) 0.05 % cream Apply topically 2 (two) times a day for 14 days       Objective     /72   Pulse 83   Temp (!) 96.5 °F (35.8 °C) (Tympanic)   Resp 18   Ht 5' 4\" (1.626 m)   Wt 39.4 kg (86 lb 12.8 oz)   SpO2 95%   BMI 14.90 kg/m²     Physical Exam  Vitals and nursing note reviewed.   Constitutional:       Appearance: Normal appearance.      Comments: Underweight   HENT:      Head: Normocephalic and atraumatic.   Cardiovascular:      Rate and Rhythm: Normal rate and regular rhythm.      Heart sounds: No murmur heard.  Pulmonary:      Effort: Pulmonary effort is normal.      Breath sounds: Normal breath sounds.   Abdominal:      General: Abdomen is flat. Bowel sounds are normal. There is no " distension.      Palpations: Abdomen is soft.      Tenderness: There is no abdominal tenderness.   Musculoskeletal:      Cervical back: Normal range of motion and neck supple.      Right lower leg: No edema.      Left lower leg: No edema.      Comments: Low back exam: no spinal tenderness.  Decreased ROM with flexion, extension.     Skin:     General: Skin is warm and dry.      Findings: No rash.   Neurological:      Mental Status: She is alert.   Psychiatric:      Comments: Feels anxious       Juli Arce MD

## 2024-03-15 NOTE — ASSESSMENT & PLAN NOTE
Patient developed low back pain radiating to right hip area after shoveling snow 3 weeks ago.    Reports some improvement in pain since last week.    Recommended to take Methocarbamol 500 mg 1 tablet at bedtime as needed for muscle spasms.    Apply lidocaine 4% patches.    Take Tylenol PRN.

## 2024-03-15 NOTE — ASSESSMENT & PLAN NOTE
Patient refusing lab draw.  Educated patient on the need to obtain a lactic to monitor for sepsis.  Patient is still refusing.  Will ask lab to try again in the morning.   X-ray of the lumbar spine done on March 12, 2024 showed new since August 2020 advanced L1 compression fracture.    Patient reports improvement in low back since last week.  Take Tylenol as needed for pain.    Recommend evaluation by orthopedic surgeon.      Schedule DEXA scan.

## 2024-03-15 NOTE — ASSESSMENT & PLAN NOTE
X-ray lumbar spine done on 3/12/24 showed new since August 2020 advanced L1 compression fracture.    Stable L4 and L5 compression fractures.  Recommended to schedule DEXA scan.  Discussed treatment options for osteoporosis including Fosamax, Boniva, Reclast infusion, Prolia injections.    Reviewed side effects.    Patient is scheduled for dental work.    Recommended to get clearance from dentist before starting medical therapy for osteoporosis.    Advised to start Vit D 2000 IU daily, total calcium intake of 1200 mg daily from dietary sources and supplements.    Discussed fall precautions.    Check CMP, vit D 25 OH, PTH.

## 2024-03-18 ENCOUNTER — TELEPHONE (OUTPATIENT)
Dept: FAMILY MEDICINE CLINIC | Facility: CLINIC | Age: 74
End: 2024-03-18

## 2024-03-18 ENCOUNTER — TELEPHONE (OUTPATIENT)
Dept: SURGERY | Facility: CLINIC | Age: 74
End: 2024-03-18

## 2024-03-18 NOTE — TELEPHONE ENCOUNTER
Voicemail: Hello. Sorry to bother you. I just got done speaking to Doctor Cartwright's office because I had to cancel for tomorrow and then I'll reschedule. But I'm calling about actually what was on and Corin took care of it. She goes, no, that's wrong. You're not a new patient. I don't know why that said that the prepaid is wrong of 164, it's actually 30. This is on the summary that you get when you're leaving the office. So I just wanted you to know that Shayy was taking care of Shayy. Iglesia was taking care of it. She said I'm not a new patient, so that's why I called to show that. Why did this show on my doctor Moira's pronoun in the prepay? All that's wrong. Could you call me back? Because I wanted to tell you that I got that straightened out. Sorry to bother you about something. So not important. OK, thanks. Jessica. [phone]. I have to make a lot of phone calls today, so if you call me and it's busy, just call me at your leisure. Thank you. Bye.    - I returned call but line was bussy. Our office did not schedule appointment with [facility]'s General Surgery [facility], Pedro Paz. Appointment showed on last visit summer as it was scheduled. Appointment has since been canceled.

## 2024-05-14 ENCOUNTER — TELEPHONE (OUTPATIENT)
Age: 74
End: 2024-05-14

## 2024-05-15 ENCOUNTER — TELEPHONE (OUTPATIENT)
Age: 74
End: 2024-05-15

## 2024-05-15 NOTE — TELEPHONE ENCOUNTER
Pt called stating she has an upcoming appt with Dr Boucher.  Pt states she when she went to the bathroom yesterday there was blood in her stool and on the toilet paper.  I called Mo in the office and she offered to speak to pt so I transferred her

## 2024-05-15 NOTE — TELEPHONE ENCOUNTER
Patient called and would like to know if it is normal to have a tear and  bleeding from her hemorrhoids.  She said she is worried about and would like a call back.  There is a note from yesterday in her chart from Kaelyn Balderrama.

## 2024-06-27 ENCOUNTER — OFFICE VISIT (OUTPATIENT)
Dept: PODIATRY | Facility: CLINIC | Age: 74
End: 2024-06-27

## 2024-06-27 VITALS
WEIGHT: 87 LBS | RESPIRATION RATE: 18 BRPM | HEIGHT: 64 IN | SYSTOLIC BLOOD PRESSURE: 115 MMHG | HEART RATE: 83 BPM | DIASTOLIC BLOOD PRESSURE: 75 MMHG | BODY MASS INDEX: 14.85 KG/M2

## 2024-06-27 DIAGNOSIS — L84 CORNS: Primary | ICD-10-CM

## 2024-06-27 DIAGNOSIS — L60.3 NAIL DYSTROPHY: ICD-10-CM

## 2024-06-27 PROCEDURE — NCFTCARE PR NON-COVERED FOOT CARE: Performed by: PODIATRIST

## 2024-06-27 NOTE — PROGRESS NOTES
Patient presents for palliative footcare.  Patient has multiple hyperkeratotic lesions on both feet.  She also has long thick toenails that she is unable to cut.  On exam, hyperkeratotic lesions of the fifth metatarsal head bilateral and right heel.  There are also lesions at the tip of the left third toe and a soft corn on the right fourth toe.  Toenails are dystrophic.  Pulses are trace.    Treatment consisted of nail trimming.  Reappoint 3 months.

## 2025-01-17 ENCOUNTER — TELEPHONE (OUTPATIENT)
Age: 75
End: 2025-01-17

## 2025-01-17 NOTE — TELEPHONE ENCOUNTER
Natalia would like to double check if the blood work needs fasting. She also would like more details on when is she suppose to stop taking her calcium and Vitamin D for the dexa scan. Natalia also asked if she can take advil before the dexascan incase she has a headache.     Natalia want to also leave a message for Dr. Arce. She said that she loves her dearly and she was sad when she received letter about her retiring. Please call Natalia

## 2025-01-20 ENCOUNTER — APPOINTMENT (OUTPATIENT)
Dept: LAB | Facility: MEDICAL CENTER | Age: 75
End: 2025-01-20

## 2025-01-20 ENCOUNTER — HOSPITAL ENCOUNTER (OUTPATIENT)
Dept: BONE DENSITY | Facility: MEDICAL CENTER | Age: 75
Discharge: HOME | End: 2025-01-20

## 2025-01-20 ENCOUNTER — RESULTS FOLLOW-UP (OUTPATIENT)
Dept: FAMILY MEDICINE CLINIC | Facility: CLINIC | Age: 75
End: 2025-01-20

## 2025-01-20 VITALS — BODY MASS INDEX: 14.85 KG/M2 | HEIGHT: 64 IN | WEIGHT: 87 LBS

## 2025-01-20 DIAGNOSIS — S32.010A COMPRESSION FRACTURE OF L1 VERTEBRA, INITIAL ENCOUNTER (HCC): ICD-10-CM

## 2025-01-20 DIAGNOSIS — M80.00XA AGE-RELATED OSTEOPOROSIS WITH CURRENT PATHOLOGICAL FRACTURE, INITIAL ENCOUNTER: ICD-10-CM

## 2025-01-20 LAB
25(OH)D3 SERPL-MCNC: 34.1 NG/ML (ref 30–100)
ALBUMIN SERPL BCG-MCNC: 3.2 G/DL (ref 3.5–5)
ALP SERPL-CCNC: 77 U/L (ref 34–104)
ALT SERPL W P-5'-P-CCNC: 14 U/L (ref 7–52)
ANION GAP SERPL CALCULATED.3IONS-SCNC: 8 MMOL/L (ref 4–13)
AST SERPL W P-5'-P-CCNC: 12 U/L (ref 13–39)
BILIRUB SERPL-MCNC: 0.24 MG/DL (ref 0.2–1)
BUN SERPL-MCNC: 30 MG/DL (ref 5–25)
CALCIUM ALBUM COR SERPL-MCNC: 9.2 MG/DL (ref 8.3–10.1)
CALCIUM SERPL-MCNC: 8.6 MG/DL (ref 8.4–10.2)
CHLORIDE SERPL-SCNC: 105 MMOL/L (ref 96–108)
CO2 SERPL-SCNC: 24 MMOL/L (ref 21–32)
CREAT SERPL-MCNC: 0.75 MG/DL (ref 0.6–1.3)
GFR SERPL CREATININE-BSD FRML MDRD: 77 ML/MIN/1.73SQ M
GLUCOSE SERPL-MCNC: 103 MG/DL (ref 65–140)
POTASSIUM SERPL-SCNC: 3.9 MMOL/L (ref 3.5–5.3)
PROT SERPL-MCNC: 6.2 G/DL (ref 6.4–8.4)
PTH-INTACT SERPL-MCNC: 49.8 PG/ML (ref 12–88)
SODIUM SERPL-SCNC: 137 MMOL/L (ref 135–147)

## 2025-01-20 PROCEDURE — 36415 COLL VENOUS BLD VENIPUNCTURE: CPT

## 2025-01-20 PROCEDURE — 83970 ASSAY OF PARATHORMONE: CPT

## 2025-01-20 PROCEDURE — 80053 COMPREHEN METABOLIC PANEL: CPT

## 2025-01-20 PROCEDURE — 82306 VITAMIN D 25 HYDROXY: CPT

## 2025-01-22 NOTE — TELEPHONE ENCOUNTER
Spoke to patient aware of results. Unable to schedule physical and or follow up to discuss medical therapy for osteoporosis. Patient states she doesn't want schedule two appointments. Made patient aware that both can be done at the same time.Then patient proceed to say that she wants to know if she can be called tomorrow morning because she was having stomach issues Sunday and Monday. Made patient aware that provider won't be able to know what is going on unless you are seen in the office. Patient says she is off tomorrow but can't come in the office. But to call her in Am because she can't in PM because she has a lot of things to do. Please review and advise. Thank you

## 2025-01-23 NOTE — TELEPHONE ENCOUNTER
Spoke with pt, she will call back on Monday to schedule an appt with any of our providers for a 40 minute appt for a Physical-NON MEDICARE, Discuss Severe Osteoporosis Treatment and Chronic GI symptoms.    Please assure pt that ALL her medical issues will be taken care of at this appointment

## 2025-04-09 ENCOUNTER — OFFICE VISIT (OUTPATIENT)
Dept: URGENT CARE | Age: 75
End: 2025-04-09

## 2025-04-09 VITALS
BODY MASS INDEX: 15.86 KG/M2 | HEART RATE: 80 BPM | TEMPERATURE: 97.3 F | OXYGEN SATURATION: 95 % | DIASTOLIC BLOOD PRESSURE: 80 MMHG | RESPIRATION RATE: 18 BRPM | WEIGHT: 92.4 LBS | SYSTOLIC BLOOD PRESSURE: 116 MMHG

## 2025-04-09 DIAGNOSIS — N39.0 URINARY TRACT INFECTION WITH HEMATURIA, SITE UNSPECIFIED: Primary | ICD-10-CM

## 2025-04-09 DIAGNOSIS — R31.9 URINARY TRACT INFECTION WITH HEMATURIA, SITE UNSPECIFIED: Primary | ICD-10-CM

## 2025-04-09 LAB
SL AMB  POCT GLUCOSE, UA: ABNORMAL
SL AMB LEUKOCYTE ESTERASE,UA: ABNORMAL
SL AMB POCT BILIRUBIN,UA: ABNORMAL
SL AMB POCT BLOOD,UA: ABNORMAL
SL AMB POCT CLARITY,UA: ABNORMAL
SL AMB POCT COLOR,UA: ABNORMAL
SL AMB POCT KETONES,UA: ABNORMAL
SL AMB POCT NITRITE,UA: ABNORMAL
SL AMB POCT PH,UA: 5
SL AMB POCT SPECIFIC GRAVITY,UA: 1.02
SL AMB POCT URINE PROTEIN: ABNORMAL
SL AMB POCT UROBILINOGEN: 0.2

## 2025-04-09 PROCEDURE — 99213 OFFICE O/P EST LOW 20 MIN: CPT | Performed by: PHYSICIAN ASSISTANT

## 2025-04-09 PROCEDURE — 87086 URINE CULTURE/COLONY COUNT: CPT | Performed by: PHYSICIAN ASSISTANT

## 2025-04-09 PROCEDURE — 81002 URINALYSIS NONAUTO W/O SCOPE: CPT | Performed by: PHYSICIAN ASSISTANT

## 2025-04-09 RX ORDER — CEPHALEXIN 500 MG/1
500 CAPSULE ORAL EVERY 12 HOURS SCHEDULED
Qty: 14 CAPSULE | Refills: 0 | Status: SHIPPED | OUTPATIENT
Start: 2025-04-09 | End: 2025-04-16

## 2025-04-09 NOTE — PROGRESS NOTES
[facility]        NAME: Natalia Caldera is a 74 y.o. female  : 1950    MRN: [id number]  DATE: 2025  TIME: 5:25 PM    Assessment and Plan   Urinary tract infection with hematuria, site unspecified [N39.0, R31.9]  1. Urinary tract infection with hematuria, site unspecified  POCT urine dip    Urine culture    Urine culture    cephalexin (KEFLEX) 500 mg capsule            Patient Instructions       Follow up with PCP in 3-5 days.  Proceed to  ER if symptoms worsen.    If tests have been performed at [facility], our office will contact you with results if changes need to be made to the care plan discussed with you at the visit.  [facility]    Chief Complaint     Chief Complaint   Patient presents with    Urinary Tract Infection     Started Thursday. Having frequency to urinate. No blood and burning. Urine was clear.     Constipation     Strains to have a bowel movement, but does not believe she is constipated. Has a hx of hemorrhoids. Does not happen all the time. Sometimes has rectal bleeding. Did have surgery for intestines years ago. Bled today while using the bathroom, claims to have excessive bleeding.            History of Present Illness       Patient here for evaluation of urinary frequency and pressure for the last few days.  Patient states that earlier today she was having a bowel movement and was wiping hard and started bleeding.  She does have a history of hemorrhoids.  The bleeding has slowed to almost a stop.    Urinary Tract Infection   Associated symptoms include frequency and urgency. Pertinent negatives include no flank pain, hematuria, nausea or vomiting.   Constipation  Pertinent negatives include no abdominal pain, diarrhea, difficulty urinating, nausea, rectal pain or vomiting.       Review of Systems   Review of Systems   Constitutional: Negative.    Gastrointestinal:  Positive for constipation. Negative for abdominal pain,  diarrhea, nausea, rectal pain and vomiting.   Genitourinary:  Positive for decreased urine volume, frequency and urgency. Negative for difficulty urinating, dysuria, enuresis, flank pain and hematuria.         Current Medications       Current Outpatient Medications:     cephalexin (KEFLEX) 500 mg capsule, Take 1 capsule (500 mg total) by mouth every 12 (twelve) hours for 7 days, Disp: 14 capsule, Rfl: 0    benzonatate (TESSALON PERLES) 100 mg capsule, Take 1 capsule (100 mg total) by mouth 3 (three) times a day as needed for cough, Disp: 20 capsule, Rfl: 0    calcium carbonate-vitamin D 500 mg-5 mcg per tablet, Take 1 tablet by mouth daily with breakfast, Disp: , Rfl:     cholecalciferol (VITAMIN D3) 1,000 units tablet, Take 2 tab. daily, Disp: , Rfl:     clobetasol (TEMOVATE) 0.05 % cream, Apply topically 2 (two) times a day for 14 days, Disp: 15 g, Rfl: 0    ibuprofen (ADVIL,MOTRIN) 100 MG tablet, Take 100 mg by mouth every 6 (six) hours as needed for mild pain, Disp: , Rfl:     methocarbamol (ROBAXIN) 500 mg tablet, Take 1 tab. at bedtime for muscle spasms/ back pain, Disp: 30 tablet, Rfl: 0    Current Allergies     Allergies as of 04/09/2025 - Reviewed 04/09/2025   Allergen Reaction Noted    Tetracyclines & related Hives 01/27/2018            The following portions of the patient's history were reviewed and updated as appropriate: allergies, current medications, past family history, past medical history, past social history, past surgical history and problem list.     Past Medical History:   Diagnosis Date    Anxiety     Seasonal allergies        Past Surgical History:   Procedure Laterality Date    EXPLORATORY LAPAROTOMY W/ BOWEL RESECTION N/A 8/22/2020    Procedure: LAPAROTOMY EXPLORATORY, REDUCTION OF INTERNAL HERNIA;  Surgeon: Siva Vargas MD;  Location: [facility];  Service: General    SMALL INTESTINE SURGERY  8/22/2020    Procedure: RESECTION SMALL BOWEL X2. SMALL BOWEL ANASTOMOSIS X2.;  Surgeon:  Siva Vargas MD;  Location: [facility];  Service: General       Family History   Problem Relation Age of Onset    No Known Problems Mother          Medications have been verified.        Objective   /80   Pulse 80   Temp (!) 97.3 °F (36.3 °C) (Tympanic)   Resp 18   Wt 41.9 kg (92 lb 6.4 oz)   SpO2 95%   BMI 15.86 kg/m²   No LMP recorded. Patient is postmenopausal.       Physical Exam     Physical Exam  Vitals and nursing note reviewed.   Constitutional:       General: She is not in acute distress.     Appearance: Normal appearance. She is well-developed. She is not ill-appearing, toxic-appearing or diaphoretic.   HENT:      Head: Normocephalic and atraumatic.   Eyes:      Extraocular Movements: Extraocular movements intact.      Conjunctiva/sclera: Conjunctivae normal.      Pupils: Pupils are equal, round, and reactive to light.   Abdominal:      General: Bowel sounds are normal. There is no distension.      Palpations: Abdomen is soft. There is no mass.      Tenderness: There is no abdominal tenderness. There is no right CVA tenderness, left CVA tenderness, guarding or rebound.   Skin:     General: Skin is warm and dry.      Findings: No rash.   Neurological:      General: No focal deficit present.      Mental Status: She is alert and oriented to person, place, and time.   Psychiatric:         Mood and Affect: Mood normal.         Behavior: Behavior normal.         Thought Content: Thought content normal.         Judgment: Judgment normal.

## 2025-04-10 LAB — BACTERIA UR CULT: NORMAL

## 2025-07-07 ENCOUNTER — TELEPHONE (OUTPATIENT)
Dept: OTHER | Facility: OTHER | Age: 75
End: 2025-07-07

## 2025-07-07 ENCOUNTER — PROCEDURE VISIT (OUTPATIENT)
Dept: PODIATRY | Facility: CLINIC | Age: 75
End: 2025-07-07

## 2025-07-07 VITALS — RESPIRATION RATE: 18 BRPM | BODY MASS INDEX: 15.36 KG/M2 | HEIGHT: 64 IN | WEIGHT: 90 LBS

## 2025-07-07 DIAGNOSIS — L84 CORNS: Primary | ICD-10-CM

## 2025-07-07 DIAGNOSIS — L60.3 NAIL DYSTROPHY: ICD-10-CM

## 2025-07-07 PROCEDURE — NCFTCARE PR NON-COVERED FOOT CARE: Performed by: PODIATRIST

## 2025-07-07 NOTE — TELEPHONE ENCOUNTER
Pt was seen today in the office for a toenail clipping, pt states she usually pays 30$ at every appointment and today she had to pay 40$ and she was ok with that but her receipt states she has a balance of 80$ and pt expressed some concern as to why this balance is there. She does not have the funds for this at the moment. Pt was very upset and worried about this. After a few moments of talking and reassurance and advice to call the billing department tomorrow morning as well. Pt seemed ok at the end of the call. Expressed her gratitude for our talk and would like to speak to someone in the office as well first thing tomorrow morning.       Please follow up.

## 2025-07-07 NOTE — PROGRESS NOTES
"Name: Natalia Caldera      : 1950      MRN: [id number]  Encounter Provider: David Nova DPM  Encounter Date: 2025   Encounter department: [facility] [place]    Treatment consists of nail and lesion trimming.  :  Assessment & Plan  Corns  Lesion trimming       Nail dystrophy  Nail trimming           History of Present Illness   HPI  Natalia Caldera is a 75 y.o. female who presents for palliative footcare.  Patient has multiple calluses on both feet along with thick toenails that she is unable to cut.      Review of Systems       Objective   Resp 18   Ht 5' 4\" (1.626 m)   Wt 40.8 kg (90 lb)   BMI 15.45 kg/m²      Physical Exam    Cardiovascular:      Pulses: Normal pulses.     Musculoskeletal:         General: Deformity present.      Comments: Severe hallux valgus deformity right foot     Skin:     Findings: Lesion present.      Comments: Hyperkeratotic lesions beneath fifth metatarsal head bilateral and right heel.  All toenails are thick and yellow with subungual debris.               "

## (undated) DEVICE — CHLORAPREP HI-LITE 26ML ORANGE

## (undated) DEVICE — BETHLEHEM MAJOR GENERAL PACK: Brand: CARDINAL HEALTH

## (undated) DEVICE — SUT VICRYL 3-0 SH 27 IN J416H

## (undated) DEVICE — ENSEAL 20 CM SHAFT, LARGE JAW: Brand: ENSEAL X1

## (undated) DEVICE — PROXIMATE SKIN STAPLERS (35 WIDE) CONTAINS 35 STAINLESS STEEL STAPLES (FIXED HEAD): Brand: PROXIMATE

## (undated) DEVICE — SUT PROLENE 1 CT-1 30 IN 8425H

## (undated) DEVICE — PROXIMATE LINEAR CUTTER RELOAD, BLUE, 75MM: Brand: PROXIMATE

## (undated) DEVICE — SCD SEQUENTIAL COMPRESSION COMFORT SLEEVE MEDIUM KNEE LENGTH: Brand: KENDALL SCD

## (undated) DEVICE — 3M™ IOBAN™ 2 ANTIMICROBIAL INCISE DRAPE 6650EZ: Brand: IOBAN™ 2

## (undated) DEVICE — GLOVE SRG BIOGEL ORTHOPEDIC 7.5

## (undated) DEVICE — SUT SILK 3-0 SH CR/8 18 IN C013D

## (undated) DEVICE — INTENDED FOR TISSUE SEPARATION, AND OTHER PROCEDURES THAT REQUIRE A SHARP SURGICAL BLADE TO PUNCTURE OR CUT.: Brand: BARD-PARKER SAFETY BLADES SIZE 15, STERILE

## (undated) DEVICE — SUT SILK 2-0 TIES 144 IN LA55G

## (undated) DEVICE — GAUZE SPONGES,16 PLY: Brand: CURITY

## (undated) DEVICE — INTENDED FOR TISSUE SEPARATION, AND OTHER PROCEDURES THAT REQUIRE A SHARP SURGICAL BLADE TO PUNCTURE OR CUT.: Brand: BARD-PARKER SAFETY BLADES SIZE 10, STERILE

## (undated) DEVICE — 3M™ TEGADERM™ TRANSPARENT FILM DRESSING FRAME STYLE, 1627, 4 IN X 10 IN (10 CM X 25 CM), 20/CT 4CT/CASE: Brand: 3M™ TEGADERM™

## (undated) DEVICE — POOLE SUCTION HANDLE: Brand: CARDINAL HEALTH

## (undated) DEVICE — LIGACLIP MCA MULTIPLE CLIP APPLIERS, 20 MEDIUM CLIPS: Brand: LIGACLIP

## (undated) DEVICE — 3000CC GUARDIAN II: Brand: GUARDIAN

## (undated) DEVICE — TRAY FOLEY 16FR URIMETER SURESTEP

## (undated) DEVICE — PROXIMATE RELOADABLE LINEAR CUTTER WITH SAFETY LOCK-OUT, 75MM: Brand: PROXIMATE

## (undated) DEVICE — PLUMEPEN PRO 10FT

## (undated) DEVICE — SUT SILK 3-0 SH 30 IN K832H

## (undated) DEVICE — SUT VICRYL 2-0 REEL 54 IN J286G